# Patient Record
Sex: MALE | Race: WHITE | NOT HISPANIC OR LATINO | Employment: OTHER | URBAN - METROPOLITAN AREA
[De-identification: names, ages, dates, MRNs, and addresses within clinical notes are randomized per-mention and may not be internally consistent; named-entity substitution may affect disease eponyms.]

---

## 2019-01-25 ENCOUNTER — HOSPITAL ENCOUNTER (INPATIENT)
Facility: HOSPITAL | Age: 54
LOS: 2 days | Discharge: HOME | DRG: 287 | End: 2019-01-27
Attending: EMERGENCY MEDICINE | Admitting: HOSPITALIST
Payer: MEDICARE

## 2019-01-25 DIAGNOSIS — R07.9 CHEST PAIN: ICD-10-CM

## 2019-01-25 DIAGNOSIS — R00.8 TRIGEMINY: Primary | ICD-10-CM

## 2019-01-25 PROBLEM — G50.0 TRIGEMINAL NEURALGIA: Status: ACTIVE | Noted: 2019-01-25

## 2019-01-25 PROBLEM — J44.1 CHRONIC OBSTRUCTIVE PULMONARY DISEASE WITH ACUTE EXACERBATION: Status: ACTIVE | Noted: 2019-01-25

## 2019-01-25 PROBLEM — H54.8 LEGALLY BLIND: Status: ACTIVE | Noted: 2019-01-25

## 2019-01-25 PROBLEM — Z72.0 TOBACCO USE: Status: ACTIVE | Noted: 2019-01-25

## 2019-01-25 PROBLEM — F33.9 EPISODE OF RECURRENT MAJOR DEPRESSIVE DISORDER: Status: ACTIVE | Noted: 2019-01-25

## 2019-01-25 LAB
ALBUMIN SERPL BCP-MCNC: 3.6 G/DL
ALP SERPL-CCNC: 80 U/L
ALT SERPL W/O P-5'-P-CCNC: 15 U/L
ANION GAP SERPL CALC-SCNC: 10 MMOL/L
AST SERPL-CCNC: 12 U/L
BASOPHILS # BLD AUTO: 0.1 K/UL
BASOPHILS NFR BLD: 0.9 %
BILIRUB SERPL-MCNC: 0.3 MG/DL
BNP SERPL-MCNC: <10 PG/ML
BUN SERPL-MCNC: 14 MG/DL
CALCIUM SERPL-MCNC: 9.4 MG/DL
CHLORIDE SERPL-SCNC: 104 MMOL/L
CO2 SERPL-SCNC: 24 MMOL/L
CREAT SERPL-MCNC: 0.9 MG/DL
DIFFERENTIAL METHOD: ABNORMAL
EOSINOPHIL # BLD AUTO: 0.3 K/UL
EOSINOPHIL NFR BLD: 2.3 %
ERYTHROCYTE [DISTWIDTH] IN BLOOD BY AUTOMATED COUNT: 13.5 %
EST. GFR  (AFRICAN AMERICAN): >60 ML/MIN/1.73 M^2
EST. GFR  (NON AFRICAN AMERICAN): >60 ML/MIN/1.73 M^2
GLUCOSE SERPL-MCNC: 98 MG/DL
HCT VFR BLD AUTO: 45.3 %
HGB BLD-MCNC: 14.8 G/DL
LYMPHOCYTES # BLD AUTO: 4.3 K/UL
LYMPHOCYTES NFR BLD: 32.4 %
MCH RBC QN AUTO: 30.1 PG
MCHC RBC AUTO-ENTMCNC: 32.7 G/DL
MCV RBC AUTO: 92 FL
MONOCYTES # BLD AUTO: 0.6 K/UL
MONOCYTES NFR BLD: 4.7 %
NEUTROPHILS # BLD AUTO: 7.9 K/UL
NEUTROPHILS NFR BLD: 59.7 %
PLATELET # BLD AUTO: 327 K/UL
PMV BLD AUTO: 7.7 FL
POTASSIUM SERPL-SCNC: 4.3 MMOL/L
PROT SERPL-MCNC: 6.8 G/DL
RBC # BLD AUTO: 4.92 M/UL
SODIUM SERPL-SCNC: 138 MMOL/L
TROPONIN I SERPL DL<=0.01 NG/ML-MCNC: 0.01 NG/ML
TROPONIN I SERPL DL<=0.01 NG/ML-MCNC: 0.01 NG/ML
TROPONIN I SERPL DL<=0.01 NG/ML-MCNC: <0.006 NG/ML
WBC # BLD AUTO: 13.2 K/UL

## 2019-01-25 PROCEDURE — G0378 HOSPITAL OBSERVATION PER HR: HCPCS

## 2019-01-25 PROCEDURE — 25000003 PHARM REV CODE 250: Performed by: EMERGENCY MEDICINE

## 2019-01-25 PROCEDURE — 85025 COMPLETE CBC W/AUTO DIFF WBC: CPT

## 2019-01-25 PROCEDURE — 83880 ASSAY OF NATRIURETIC PEPTIDE: CPT

## 2019-01-25 PROCEDURE — 25000003 PHARM REV CODE 250: Performed by: HOSPITALIST

## 2019-01-25 PROCEDURE — 84484 ASSAY OF TROPONIN QUANT: CPT | Mod: 91

## 2019-01-25 PROCEDURE — 83036 HEMOGLOBIN GLYCOSYLATED A1C: CPT

## 2019-01-25 PROCEDURE — 63600175 PHARM REV CODE 636 W HCPCS: Performed by: HOSPITALIST

## 2019-01-25 PROCEDURE — 93005 ELECTROCARDIOGRAM TRACING: CPT

## 2019-01-25 PROCEDURE — 84484 ASSAY OF TROPONIN QUANT: CPT

## 2019-01-25 PROCEDURE — 36415 COLL VENOUS BLD VENIPUNCTURE: CPT

## 2019-01-25 PROCEDURE — 25000003 PHARM REV CODE 250: Performed by: NURSE PRACTITIONER

## 2019-01-25 PROCEDURE — 99285 EMERGENCY DEPT VISIT HI MDM: CPT | Mod: 25

## 2019-01-25 PROCEDURE — 80053 COMPREHEN METABOLIC PANEL: CPT

## 2019-01-25 PROCEDURE — 12000002 HC ACUTE/MED SURGE SEMI-PRIVATE ROOM

## 2019-01-25 RX ORDER — ONDANSETRON 2 MG/ML
4 INJECTION INTRAMUSCULAR; INTRAVENOUS EVERY 8 HOURS PRN
Status: DISCONTINUED | OUTPATIENT
Start: 2019-01-25 | End: 2019-01-27 | Stop reason: HOSPADM

## 2019-01-25 RX ORDER — GABAPENTIN 800 MG/1
800 TABLET ORAL 3 TIMES DAILY
COMMUNITY

## 2019-01-25 RX ORDER — LEVOTHYROXINE SODIUM 25 UG/1
25 TABLET ORAL DAILY
COMMUNITY

## 2019-01-25 RX ORDER — BENZONATATE 100 MG/1
200 CAPSULE ORAL 3 TIMES DAILY PRN
Status: DISCONTINUED | OUTPATIENT
Start: 2019-01-25 | End: 2019-01-27 | Stop reason: HOSPADM

## 2019-01-25 RX ORDER — IBUPROFEN 200 MG
24 TABLET ORAL
Status: DISCONTINUED | OUTPATIENT
Start: 2019-01-25 | End: 2019-01-27 | Stop reason: HOSPADM

## 2019-01-25 RX ORDER — FLUTICASONE PROPIONATE 50 MCG
1 SPRAY, SUSPENSION (ML) NASAL DAILY
COMMUNITY

## 2019-01-25 RX ORDER — SODIUM,POTASSIUM PHOSPHATES 280-250MG
2 POWDER IN PACKET (EA) ORAL
Status: DISCONTINUED | OUTPATIENT
Start: 2019-01-25 | End: 2019-01-27 | Stop reason: HOSPADM

## 2019-01-25 RX ORDER — CITALOPRAM 40 MG/1
40 TABLET, FILM COATED ORAL DAILY
COMMUNITY

## 2019-01-25 RX ORDER — LEVALBUTEROL 1.25 MG/.5ML
1.25 SOLUTION, CONCENTRATE RESPIRATORY (INHALATION) EVERY 8 HOURS
Status: DISCONTINUED | OUTPATIENT
Start: 2019-01-26 | End: 2019-01-27 | Stop reason: HOSPADM

## 2019-01-25 RX ORDER — LANOLIN ALCOHOL/MO/W.PET/CERES
800 CREAM (GRAM) TOPICAL
Status: DISCONTINUED | OUTPATIENT
Start: 2019-01-25 | End: 2019-01-27 | Stop reason: HOSPADM

## 2019-01-25 RX ORDER — ATORVASTATIN CALCIUM 40 MG/1
40 TABLET, FILM COATED ORAL DAILY
Status: ON HOLD | COMMUNITY
End: 2019-01-27 | Stop reason: HOSPADM

## 2019-01-25 RX ORDER — POTASSIUM CHLORIDE 20 MEQ/15ML
40 SOLUTION ORAL
Status: DISCONTINUED | OUTPATIENT
Start: 2019-01-25 | End: 2019-01-27 | Stop reason: HOSPADM

## 2019-01-25 RX ORDER — MORPHINE SULFATE 4 MG/ML
4 INJECTION, SOLUTION INTRAMUSCULAR; INTRAVENOUS EVERY 4 HOURS PRN
Status: DISCONTINUED | OUTPATIENT
Start: 2019-01-25 | End: 2019-01-27 | Stop reason: HOSPADM

## 2019-01-25 RX ORDER — ALBUTEROL SULFATE 90 UG/1
2 AEROSOL, METERED RESPIRATORY (INHALATION) EVERY 6 HOURS PRN
COMMUNITY

## 2019-01-25 RX ORDER — NITROGLYCERIN 0.4 MG/1
0.4 TABLET SUBLINGUAL EVERY 5 MIN PRN
Status: DISCONTINUED | OUTPATIENT
Start: 2019-01-25 | End: 2019-01-27 | Stop reason: HOSPADM

## 2019-01-25 RX ORDER — AZITHROMYCIN 250 MG/1
500 TABLET, FILM COATED ORAL ONCE
Status: COMPLETED | OUTPATIENT
Start: 2019-01-25 | End: 2019-01-25

## 2019-01-25 RX ORDER — ACETAMINOPHEN 325 MG/1
650 TABLET ORAL EVERY 6 HOURS PRN
Status: DISCONTINUED | OUTPATIENT
Start: 2019-01-25 | End: 2019-01-27 | Stop reason: HOSPADM

## 2019-01-25 RX ORDER — GABAPENTIN 400 MG/1
800 CAPSULE ORAL 3 TIMES DAILY
Status: DISCONTINUED | OUTPATIENT
Start: 2019-01-25 | End: 2019-01-27 | Stop reason: HOSPADM

## 2019-01-25 RX ORDER — GLUCAGON 1 MG
1 KIT INJECTION
Status: DISCONTINUED | OUTPATIENT
Start: 2019-01-25 | End: 2019-01-27 | Stop reason: HOSPADM

## 2019-01-25 RX ORDER — ASPIRIN 325 MG
325 TABLET ORAL
Status: COMPLETED | OUTPATIENT
Start: 2019-01-25 | End: 2019-01-25

## 2019-01-25 RX ORDER — IBUPROFEN 200 MG
16 TABLET ORAL
Status: DISCONTINUED | OUTPATIENT
Start: 2019-01-25 | End: 2019-01-27 | Stop reason: HOSPADM

## 2019-01-25 RX ORDER — CITALOPRAM 40 MG/1
40 TABLET, FILM COATED ORAL DAILY
Status: DISCONTINUED | OUTPATIENT
Start: 2019-01-26 | End: 2019-01-27 | Stop reason: HOSPADM

## 2019-01-25 RX ORDER — ATORVASTATIN CALCIUM 40 MG/1
40 TABLET, FILM COATED ORAL DAILY
Status: DISCONTINUED | OUTPATIENT
Start: 2019-01-26 | End: 2019-01-26

## 2019-01-25 RX ORDER — AZITHROMYCIN 250 MG/1
250 TABLET, FILM COATED ORAL DAILY
Status: DISCONTINUED | OUTPATIENT
Start: 2019-01-26 | End: 2019-01-27 | Stop reason: HOSPADM

## 2019-01-25 RX ORDER — METHYLPREDNISOLONE SOD SUCC 125 MG
80 VIAL (EA) INJECTION EVERY 8 HOURS
Status: DISCONTINUED | OUTPATIENT
Start: 2019-01-25 | End: 2019-01-26

## 2019-01-25 RX ORDER — SODIUM CHLORIDE 0.9 % (FLUSH) 0.9 %
5 SYRINGE (ML) INJECTION
Status: DISCONTINUED | OUTPATIENT
Start: 2019-01-25 | End: 2019-01-27 | Stop reason: HOSPADM

## 2019-01-25 RX ORDER — ACETAMINOPHEN 500 MG
500 TABLET ORAL EVERY 8 HOURS PRN
COMMUNITY

## 2019-01-25 RX ORDER — LEVOTHYROXINE SODIUM 25 UG/1
25 TABLET ORAL
Status: DISCONTINUED | OUTPATIENT
Start: 2019-01-26 | End: 2019-01-27 | Stop reason: HOSPADM

## 2019-01-25 RX ORDER — GUAIFENESIN 600 MG/1
600 TABLET, EXTENDED RELEASE ORAL 2 TIMES DAILY
Status: DISCONTINUED | OUTPATIENT
Start: 2019-01-25 | End: 2019-01-27 | Stop reason: HOSPADM

## 2019-01-25 RX ORDER — ALBUTEROL SULFATE 90 UG/1
2 AEROSOL, METERED RESPIRATORY (INHALATION) EVERY 6 HOURS PRN
Status: DISCONTINUED | OUTPATIENT
Start: 2019-01-25 | End: 2019-01-25

## 2019-01-25 RX ORDER — FAMOTIDINE 20 MG/1
20 TABLET, FILM COATED ORAL 2 TIMES DAILY
Status: DISCONTINUED | OUTPATIENT
Start: 2019-01-25 | End: 2019-01-27 | Stop reason: HOSPADM

## 2019-01-25 RX ORDER — METHOCARBAMOL 500 MG/1
500 TABLET, FILM COATED ORAL 4 TIMES DAILY PRN
Status: DISCONTINUED | OUTPATIENT
Start: 2019-01-25 | End: 2019-01-27 | Stop reason: HOSPADM

## 2019-01-25 RX ORDER — METHOCARBAMOL 500 MG/1
500 TABLET, FILM COATED ORAL 4 TIMES DAILY PRN
COMMUNITY

## 2019-01-25 RX ORDER — MORPHINE SULFATE 2 MG/ML
2 INJECTION, SOLUTION INTRAMUSCULAR; INTRAVENOUS EVERY 4 HOURS PRN
Status: DISCONTINUED | OUTPATIENT
Start: 2019-01-25 | End: 2019-01-27 | Stop reason: HOSPADM

## 2019-01-25 RX ORDER — ASPIRIN 325 MG
325 TABLET ORAL DAILY
Status: DISCONTINUED | OUTPATIENT
Start: 2019-01-26 | End: 2019-01-27 | Stop reason: HOSPADM

## 2019-01-25 RX ORDER — ACETAMINOPHEN 500 MG
500 TABLET ORAL EVERY 8 HOURS PRN
Status: DISCONTINUED | OUTPATIENT
Start: 2019-01-25 | End: 2019-01-25

## 2019-01-25 RX ADMIN — AZITHROMYCIN 500 MG: 250 TABLET, FILM COATED ORAL at 10:01

## 2019-01-25 RX ADMIN — GUAIFENESIN 600 MG: 600 TABLET, EXTENDED RELEASE ORAL at 08:01

## 2019-01-25 RX ADMIN — METHYLPREDNISOLONE SODIUM SUCCINATE 80 MG: 125 INJECTION, POWDER, FOR SOLUTION INTRAMUSCULAR; INTRAVENOUS at 08:01

## 2019-01-25 RX ADMIN — ASPIRIN 325 MG ORAL TABLET 325 MG: 325 PILL ORAL at 02:01

## 2019-01-25 RX ADMIN — FAMOTIDINE 20 MG: 20 TABLET, FILM COATED ORAL at 08:01

## 2019-01-25 RX ADMIN — BENZONATATE 200 MG: 100 CAPSULE ORAL at 10:01

## 2019-01-25 RX ADMIN — METHOCARBAMOL TABLETS 500 MG: 500 TABLET, COATED ORAL at 08:01

## 2019-01-25 RX ADMIN — GABAPENTIN 800 MG: 400 CAPSULE ORAL at 08:01

## 2019-01-25 NOTE — ED NOTES
Upon transfer to room 201, patient is AAOx4, no cardiac or respiratory complications. No needs or questions at this time.

## 2019-01-25 NOTE — UM SECONDARY REVIEW
Physician Advisor External    Level of Care Issue    Approved Inpatient for admit 1/25/19 per ehr md review..

## 2019-01-25 NOTE — ED NOTES
Patient has been updated on plan of care, results and admission. Dr. Kaiser is at the bedside of patient.

## 2019-01-25 NOTE — ED PROVIDER NOTES
Encounter Date: 2019    SCRIBE #1 NOTE: I, Sarah Hernandez, am scribing for, and in the presence of, Dr. Harshad Moran.       History     Chief Complaint   Patient presents with    Abnormal ECG     saw PCP for chest pain. EKG showed NSR with PVC's. sent for further evaluation.       Time seen by provider: 1:54 PM on 2019    Brandon Shahid Jr. is a 53 y.o. male with a PMHx that includes COPD, Trigeminal neuralgia, Hypercholesteremia, hypothyroid, high cholesterol, and blindness who presents to the ED with an onset of intermittent sharp left-sided chest pain that has been present for 8-9 months. Pt feels like something is twisting in his chest. Prior to arriving to the ED, he had an abnormal EKG at his PCP's office, Tonya Saldaña NP whom advised the pt to come to the ED for further evaluation. He has not seen anyone about his symptoms before because he refused to see someone in [place], which was his previous residence. Pt becomes short of breath while walking. He has at home breathing machine. He endorses feet swelling and fatigue. His wife notes that he often becomes nauseated and vomits after eating. His mother  at 44 years old due to a massive heart attack. The patient denies any other symptoms at this time. No pertinent PSHx noted. Pt is a smoker. No known drug allergies noted.       The history is provided by the patient and the spouse.     Review of patient's allergies indicates:  No Known Allergies  Past Medical History:   Diagnosis Date    Blind     Chronic back pain     COPD (chronic obstructive pulmonary disease)     Depression     Emphysema of lung     Hypercholesteremia     Hypothyroid     Trigeminal neuralgia      Past Surgical History:   Procedure Laterality Date    APPENDECTOMY      HEMORRHOID SURGERY       History reviewed. No pertinent family history.  Social History     Tobacco Use    Smoking status: Current Every Day Smoker     Packs/day: 1.50     Types: Cigarettes     Smokeless tobacco: Never Used   Substance Use Topics    Alcohol use: No     Frequency: Never    Drug use: No     Review of Systems   Constitutional: Positive for fatigue. Negative for fever.   HENT: Negative for sore throat.    Respiratory: Positive for shortness of breath.    Cardiovascular: Positive for chest pain.   Gastrointestinal: Positive for nausea and vomiting.   Genitourinary: Negative for dysuria.   Musculoskeletal: Positive for joint swelling. Negative for back pain.   Skin: Negative for rash.   Neurological: Negative for weakness.   Hematological: Does not bruise/bleed easily.       Physical Exam     Initial Vitals [01/25/19 1143]   BP Pulse Resp Temp SpO2   114/65 90 18 97.8 °F (36.6 °C) 98 %      MAP       --         Physical Exam    Nursing note and vitals reviewed.  Constitutional: He appears well-developed and well-nourished. No distress.   Blind. Smells of cigarettes.   HENT:   Head: Normocephalic and atraumatic.   Eyes: Conjunctivae and EOM are normal. Pupils are equal, round, and reactive to light.   Neck: Neck supple.   Cardiovascular: Normal rate, regular rhythm and normal heart sounds. Exam reveals no gallop and no friction rub.    No murmur heard.  Pulmonary/Chest: Effort normal. No respiratory distress. He has wheezes. He has no rhonchi. He has no rales.   Mild expiratory wheezing bilaterally.   Abdominal: Soft. Bowel sounds are normal. He exhibits no distension. There is no tenderness.   Musculoskeletal: Normal range of motion. He exhibits no edema or tenderness.        Right lower leg: He exhibits no edema.        Left lower leg: He exhibits no edema.   Neurological: He is alert and oriented to person, place, and time.   Skin: Skin is warm and dry.   Psychiatric: He has a normal mood and affect.         ED Course   Procedures  Labs Reviewed   CBC W/ AUTO DIFFERENTIAL - Abnormal; Notable for the following components:       Result Value    WBC 13.20 (*)     MPV 7.7 (*)     Gran #  (ANC) 7.9 (*)     All other components within normal limits   COMPREHENSIVE METABOLIC PANEL   TROPONIN I   B-TYPE NATRIURETIC PEPTIDE   TROPONIN I     EKG Readings: (Independently Interpreted)   Initial Reading: No STEMI.   EKG #1 from outpatient clinic: 80 bpm. Quadrigeminy. T wave inversion in aVL. No other signs of ST elevation, depressions, or abnormal intervals.   EKG #2 in ED:  83 bpm. Trigeminy. Physiological QRS shows no signs of acute ischemia. No ST elevation or depression. Normal axis. Normal intervals.       ECG Results          EKG 12-lead (Final result)  Result time 01/25/19 14:02:42    Final result by Interface, Lab In [facility] (01/25/19 14:02:42)                 Narrative:    Test Reason : R07.9,    Vent. Rate : 083 BPM     Atrial Rate : 083 BPM     P-R Int : 126 ms          QRS Dur : 084 ms      QT Int : 366 ms       P-R-T Axes : 041 040 044 degrees     QTc Int : 430 ms    Sinus rhythm with frequent Premature ventricular complexes  Otherwise normal ECG  No previous ECGs available  Confirmed by BROOKLYN SMART MD ([id number]) on 1/25/2019 2:02:32 PM    Referred By: [email or web address]   SELF           Confirmed By:BROOKLYN SMART MD                            Imaging Results          X-Ray Chest AP Portable (Final result)  Result time 01/25/19 14:46:40    Final result by Jean Marie Baker Jr., MD (01/25/19 14:46:40)                 Impression:      No acute abnormality.      Electronically signed by: Jean Marie Baker MD  Date:    01/25/2019  Time:    14:46             Narrative:    EXAMINATION:  XR CHEST AP PORTABLE    CLINICAL HISTORY:  Chest Pain;    TECHNIQUE:  Single frontal view of the chest was performed.    COMPARISON:  None    FINDINGS:  The lungs are clear, with normal appearance of pulmonary vasculature and no pleural effusion or pneumothorax.    The cardiac silhouette is normal in size. The hilar and mediastinal contours are unremarkable.    Bones are intact.                                 Medical  Decision Making:   History:   Old Medical Records: I decided to obtain old medical records.  Clinical Tests:   Lab Tests: Ordered and Reviewed  Radiological Study: Ordered and Reviewed  Medical Tests: Ordered and Reviewed  Patient will be admitted for chest pain given risk factors and a heart score 4 with trigeminy on EKG.  I spoke with hospitalist agrees with plan.  Troponin is negative I do not think he needs emergent angiogram at this time.  We will admit here for further evaluation / stratification of his chest pain.            Scribe Attestation:   Scribe #1: I performed the above scribed service and the documentation accurately describes the services I performed. I attest to the accuracy of the note.    I, Dr. Harshad Moran personally performed the services described in this documentation. All medical record entries made by the scribe were at my direction and in my presence.  I have reviewed the chart and agree that the record reflects my personal performance and is accurate and complete. Harshad Moran MD.  4:36 PM 01/25/2019    DISCLAIMER: This note was prepared with Dragon NaturallySpeaking voice recognition transcription software. Garbled syntax, mangled pronouns, and other bizarre constructions may be attributed to that software system            Clinical Impression:   The primary encounter diagnosis was Trigeminy. A diagnosis of Chest pain was also pertinent to this visit.      Disposition:   Disposition: Placed in Observation                        Harshad Moran MD  01/25/19 2146

## 2019-01-25 NOTE — ED NOTES
"Presents to the ER from his PCP office for evaluation of NSR with PVCs. Associated complaints are nausea, emesis with the last episode being 2 days ago, productive cough, and body aches. Patient reports left sided chest pain that "Feels like pain and is empty." Denies SOB, diaphoresis, nausea or vomiting with chest pain. Mucous membranes are pink and moist. Skin is warm, dry and intact. Lungs are clear bilaterally, respirations are regular and unlabored. Denies congestion, rhinorrhea or SOB. BS active x4, no tenderness with palpation, abd is soft and not distended. Denies any appetite or activity change. S1S2, capillary refill is < 2 seconds. Denies dysuria, difficulty urinating, frequency, numbness, tingling or weakness. CHHAYA DE LA O    "

## 2019-01-26 PROBLEM — F17.200 TOBACCO DEPENDENCY: Status: ACTIVE | Noted: 2019-01-25

## 2019-01-26 PROBLEM — E03.9 ACQUIRED HYPOTHYROIDISM: Status: ACTIVE | Noted: 2019-01-26

## 2019-01-26 LAB
ALBUMIN SERPL BCP-MCNC: 3.6 G/DL
ALP SERPL-CCNC: 85 U/L
ALT SERPL W/O P-5'-P-CCNC: 17 U/L
ANION GAP SERPL CALC-SCNC: 10 MMOL/L
AST SERPL-CCNC: 13 U/L
BASOPHILS # BLD AUTO: 0 K/UL
BASOPHILS NFR BLD: 0.4 %
BILIRUB SERPL-MCNC: 0.2 MG/DL
BUN SERPL-MCNC: 14 MG/DL
CALCIUM SERPL-MCNC: 9.9 MG/DL
CHLORIDE SERPL-SCNC: 105 MMOL/L
CHOLEST SERPL-MCNC: 247 MG/DL
CHOLEST SERPL-MCNC: 247 MG/DL
CHOLEST/HDLC SERPL: 6.5 {RATIO}
CHOLEST/HDLC SERPL: 6.5 {RATIO}
CO2 SERPL-SCNC: 22 MMOL/L
CREAT SERPL-MCNC: 1 MG/DL
DIFFERENTIAL METHOD: ABNORMAL
EOSINOPHIL # BLD AUTO: 0 K/UL
EOSINOPHIL NFR BLD: 0 %
ERYTHROCYTE [DISTWIDTH] IN BLOOD BY AUTOMATED COUNT: 13.7 %
EST. GFR  (AFRICAN AMERICAN): >60 ML/MIN/1.73 M^2
EST. GFR  (NON AFRICAN AMERICAN): >60 ML/MIN/1.73 M^2
ESTIMATED AVG GLUCOSE: 111 MG/DL
GLUCOSE SERPL-MCNC: 165 MG/DL
HBA1C MFR BLD HPLC: 5.5 %
HCT VFR BLD AUTO: 46.8 %
HDLC SERPL-MCNC: 38 MG/DL
HDLC SERPL-MCNC: 38 MG/DL
HDLC SERPL: 15.4 %
HDLC SERPL: 15.4 %
HGB BLD-MCNC: 15.3 G/DL
LDLC SERPL CALC-MCNC: 181.6 MG/DL
LDLC SERPL CALC-MCNC: 181.6 MG/DL
LYMPHOCYTES # BLD AUTO: 1.3 K/UL
LYMPHOCYTES NFR BLD: 14 %
MAGNESIUM SERPL-MCNC: 2.3 MG/DL
MCH RBC QN AUTO: 30.2 PG
MCHC RBC AUTO-ENTMCNC: 32.8 G/DL
MCV RBC AUTO: 92 FL
MONOCYTES # BLD AUTO: 0.1 K/UL
MONOCYTES NFR BLD: 0.6 %
NEUTROPHILS # BLD AUTO: 7.8 K/UL
NEUTROPHILS NFR BLD: 85 %
NONHDLC SERPL-MCNC: 209 MG/DL
NONHDLC SERPL-MCNC: 209 MG/DL
PLATELET # BLD AUTO: 335 K/UL
PMV BLD AUTO: 7.7 FL
POTASSIUM SERPL-SCNC: 4.7 MMOL/L
PROT SERPL-MCNC: 7.1 G/DL
RBC # BLD AUTO: 5.07 M/UL
SODIUM SERPL-SCNC: 137 MMOL/L
TRIGL SERPL-MCNC: 137 MG/DL
TRIGL SERPL-MCNC: 137 MG/DL
TROPONIN I SERPL DL<=0.01 NG/ML-MCNC: <0.006 NG/ML
TSH SERPL DL<=0.005 MIU/L-ACNC: 1.46 UIU/ML
WBC # BLD AUTO: 9.2 K/UL

## 2019-01-26 PROCEDURE — 94761 N-INVAS EAR/PLS OXIMETRY MLT: CPT

## 2019-01-26 PROCEDURE — 36415 COLL VENOUS BLD VENIPUNCTURE: CPT

## 2019-01-26 PROCEDURE — 12000002 HC ACUTE/MED SURGE SEMI-PRIVATE ROOM

## 2019-01-26 PROCEDURE — 80053 COMPREHEN METABOLIC PANEL: CPT

## 2019-01-26 PROCEDURE — 25000003 PHARM REV CODE 250: Performed by: NURSE PRACTITIONER

## 2019-01-26 PROCEDURE — 25000003 PHARM REV CODE 250: Performed by: HOSPITALIST

## 2019-01-26 PROCEDURE — 25000003 PHARM REV CODE 250: Performed by: EMERGENCY MEDICINE

## 2019-01-26 PROCEDURE — 63600175 PHARM REV CODE 636 W HCPCS: Performed by: HOSPITALIST

## 2019-01-26 PROCEDURE — 83735 ASSAY OF MAGNESIUM: CPT

## 2019-01-26 PROCEDURE — 63600175 PHARM REV CODE 636 W HCPCS: Performed by: NURSE PRACTITIONER

## 2019-01-26 PROCEDURE — 80061 LIPID PANEL: CPT

## 2019-01-26 PROCEDURE — 84484 ASSAY OF TROPONIN QUANT: CPT

## 2019-01-26 PROCEDURE — 84443 ASSAY THYROID STIM HORMONE: CPT

## 2019-01-26 PROCEDURE — 94640 AIRWAY INHALATION TREATMENT: CPT

## 2019-01-26 PROCEDURE — 25000242 PHARM REV CODE 250 ALT 637 W/ HCPCS: Performed by: HOSPITALIST

## 2019-01-26 PROCEDURE — 85025 COMPLETE CBC W/AUTO DIFF WBC: CPT

## 2019-01-26 RX ORDER — PREDNISONE 20 MG/1
40 TABLET ORAL DAILY
Status: DISCONTINUED | OUTPATIENT
Start: 2019-01-26 | End: 2019-01-27 | Stop reason: HOSPADM

## 2019-01-26 RX ORDER — ATORVASTATIN CALCIUM 40 MG/1
80 TABLET, FILM COATED ORAL DAILY
Status: DISCONTINUED | OUTPATIENT
Start: 2019-01-26 | End: 2019-01-27 | Stop reason: HOSPADM

## 2019-01-26 RX ADMIN — LEVALBUTEROL 1.25 MG: 1.25 SOLUTION, CONCENTRATE RESPIRATORY (INHALATION) at 12:01

## 2019-01-26 RX ADMIN — FAMOTIDINE 20 MG: 20 TABLET, FILM COATED ORAL at 01:01

## 2019-01-26 RX ADMIN — ASPIRIN 325 MG ORAL TABLET 325 MG: 325 PILL ORAL at 01:01

## 2019-01-26 RX ADMIN — GABAPENTIN 800 MG: 400 CAPSULE ORAL at 08:01

## 2019-01-26 RX ADMIN — CITALOPRAM HYDROBROMIDE 40 MG: 40 TABLET ORAL at 01:01

## 2019-01-26 RX ADMIN — GUAIFENESIN 600 MG: 600 TABLET, EXTENDED RELEASE ORAL at 01:01

## 2019-01-26 RX ADMIN — METHYLPREDNISOLONE SODIUM SUCCINATE 80 MG: 125 INJECTION, POWDER, FOR SOLUTION INTRAMUSCULAR; INTRAVENOUS at 05:01

## 2019-01-26 RX ADMIN — FAMOTIDINE 20 MG: 20 TABLET, FILM COATED ORAL at 08:01

## 2019-01-26 RX ADMIN — ACETAMINOPHEN 650 MG: 325 TABLET, FILM COATED ORAL at 08:01

## 2019-01-26 RX ADMIN — ATORVASTATIN CALCIUM 80 MG: 40 TABLET, FILM COATED ORAL at 01:01

## 2019-01-26 RX ADMIN — PREDNISONE 40 MG: 20 TABLET ORAL at 01:01

## 2019-01-26 RX ADMIN — LEVALBUTEROL 1.25 MG: 1.25 SOLUTION, CONCENTRATE RESPIRATORY (INHALATION) at 07:01

## 2019-01-26 RX ADMIN — AZITHROMYCIN 250 MG: 250 TABLET, FILM COATED ORAL at 08:01

## 2019-01-26 RX ADMIN — GUAIFENESIN 600 MG: 600 TABLET, EXTENDED RELEASE ORAL at 08:01

## 2019-01-26 RX ADMIN — GABAPENTIN 800 MG: 400 CAPSULE ORAL at 01:01

## 2019-01-26 RX ADMIN — LEVALBUTEROL 1.25 MG: 1.25 SOLUTION, CONCENTRATE RESPIRATORY (INHALATION) at 11:01

## 2019-01-26 RX ADMIN — ACETAMINOPHEN 650 MG: 325 TABLET, FILM COATED ORAL at 01:01

## 2019-01-26 RX ADMIN — LEVALBUTEROL 1.25 MG: 1.25 SOLUTION, CONCENTRATE RESPIRATORY (INHALATION) at 03:01

## 2019-01-26 NOTE — ASSESSMENT & PLAN NOTE
Health hazards associated with cigarette smoking were reviewed with patient and cessation was encouraged. Nicotine replacement and counseling options were discussed.  Spent 3 minutes counseling on cessation, patient knows he needs to quit.  He refuses nicotine patch.

## 2019-01-26 NOTE — PLAN OF CARE
Problem: Adult Inpatient Plan of Care  Goal: Plan of Care Review  Patient alert and oriented.  Oriented patient to belongings at bedside, due to blindness.  Bigeminy rhythm with PVC's on cardiac monitor.  Assisted to bathroom X 2.  NPO, patient verbal understanding.  Cardiology consult this AM.  Full code.  Call light in reach.  Bed in low/locked position.  Bed alarm set for patient's safety.

## 2019-01-26 NOTE — SUBJECTIVE & OBJECTIVE
Past Medical History:   Diagnosis Date    Blind     Chronic back pain     COPD (chronic obstructive pulmonary disease)     Depression     Emphysema of lung     Hypercholesteremia     Hypothyroid     Trigeminal neuralgia        Past Surgical History:   Procedure Laterality Date    APPENDECTOMY      HEMORRHOID SURGERY         Review of patient's allergies indicates:  No Known Allergies    No current facility-administered medications on file prior to encounter.      Current Outpatient Medications on File Prior to Encounter   Medication Sig    acetaminophen (TYLENOL EXTRA STRENGTH) 500 MG tablet Take 500 mg by mouth every 8 (eight) hours as needed (pain).     albuterol (PROVENTIL HFA) 90 mcg/actuation inhaler Inhale 2 puffs into the lungs every 6 (six) hours as needed for Wheezing. Rescue    atorvastatin (LIPITOR) 40 MG tablet Take 40 mg by mouth once daily.    citalopram (CELEXA) 40 MG tablet Take 40 mg by mouth once daily.    fluticasone (FLONASE) 50 mcg/actuation nasal spray 1 spray by Each Nare route once daily.    gabapentin (NEURONTIN) 800 MG tablet Take 800 mg by mouth 3 (three) times daily.    levothyroxine (SYNTHROID) 25 MCG tablet Take 25 mcg by mouth once daily.    methocarbamol (ROBAXIN) 500 MG Tab Take 500 mg by mouth 4 (four) times daily as needed (muscle spasm).     ranitidine (ACID REDUCER, RANITIDINE,) 150 MG tablet Take 150 mg by mouth once daily.      Family History     Problem Relation (Age of Onset)    Heart disease Mother    No Known Problems Father        Tobacco Use    Smoking status: Current Every Day Smoker     Packs/day: 1.50     Types: Cigarettes    Smokeless tobacco: Never Used   Substance and Sexual Activity    Alcohol use: No     Frequency: Never    Drug use: No    Sexual activity: Not on file     Review of Systems   Constitutional: Positive for chills. Negative for activity change, appetite change and fatigue.   HENT: Negative for congestion, postnasal drip, sore  throat and trouble swallowing.    Eyes: Positive for visual disturbance (blind).   Respiratory: Positive for cough, shortness of breath and wheezing. Negative for chest tightness.    Cardiovascular: Positive for chest pain. Negative for palpitations and leg swelling.   Gastrointestinal: Positive for constipation, diarrhea, nausea and vomiting. Negative for abdominal pain.   Genitourinary: Negative for difficulty urinating, dysuria, flank pain and hematuria.   Musculoskeletal: Negative for arthralgias and myalgias.   Skin: Negative for color change.   Neurological: Positive for headaches (chronic HA issues). Negative for dizziness, syncope, weakness and light-headedness.   Psychiatric/Behavioral: Negative for confusion. The patient is not nervous/anxious.      Objective:     Vital Signs (Most Recent):  Temp: 96.4 °F (35.8 °C) (01/25/19 1955)  Pulse: 81 (01/25/19 1955)  Resp: 19 (01/25/19 1955)  BP: 113/72 (01/25/19 1955)  SpO2: 95 % (01/25/19 1955) Vital Signs (24h Range):  Temp:  [96.4 °F (35.8 °C)-97.8 °F (36.6 °C)] 96.4 °F (35.8 °C)  Pulse:  [63-92] 81  Resp:  [18-19] 19  SpO2:  [95 %-99 %] 95 %  BP: (113-137)/(65-88) 113/72     Weight: 95.1 kg (209 lb 10.5 oz)  Body mass index is 28.43 kg/m².    Physical Exam   Constitutional: He is oriented to person, place, and time. He appears well-developed and well-nourished. No distress.   HENT:   Head: Normocephalic and atraumatic.   Eyes: Conjunctivae are normal.   Blind     Neck: Normal range of motion. Neck supple.   Cardiovascular: Normal rate, regular rhythm, normal heart sounds and intact distal pulses.   No murmur heard.  Pulmonary/Chest: Effort normal. No respiratory distress.   Frequent coughing; diminished breath sounds   Abdominal: Soft. Bowel sounds are normal. He exhibits no distension.   Musculoskeletal: Normal range of motion. He exhibits no edema or tenderness.   Neurological: He is alert and oriented to person, place, and time. No cranial nerve deficit.    Skin: Skin is warm and dry. Capillary refill takes less than 2 seconds. No erythema.   Psychiatric: He has a normal mood and affect. His behavior is normal. Judgment and thought content normal.           Significant Labs:   CBC:   Recent Labs   Lab 01/25/19  1452   WBC 13.20*   HGB 14.8   HCT 45.3        CMP:   Recent Labs   Lab 01/25/19  1452      K 4.3      CO2 24   GLU 98   BUN 14   CREATININE 0.9   CALCIUM 9.4   PROT 6.8   ALBUMIN 3.6   BILITOT 0.3   ALKPHOS 80   AST 12   ALT 15   ANIONGAP 10   EGFRNONAA >60     Troponin:   Recent Labs   Lab 01/25/19  1452 01/25/19  1703   TROPONINI 0.009 0.007       Significant Imaging:   EKG: I reviewed. NSR 83 with frequent PVCs, no ischemic changes noted.     CXR: No acute abnormality.

## 2019-01-26 NOTE — PROGRESS NOTES
[facility] Medicine  Progress Note    Patient Name: Brandon Shahid Jr.  MRN: [id number]  Patient Class: IP- Inpatient   Admission Date: 1/25/2019  Length of Stay: 1 days  Attending Physician: Soha Ballard MD  Primary Care Provider: Tonya Saldaña NP        Subjective:     Principal Problem:Trigeminy    HPI:  Brandon Shahid is a 52 yo male with PMHx significant for COPD, blindness, thyroid disease, trigeminal neuralgia, HLD, and depression. He presented to the ED from the urgent care for further evaluation of chest pain.  He presented to his PCP with c/o chest pain that per patient began months ago, but has worsened.  Pain is mid-sternal and waxes and wanes in severity.  Pain sometimes seems related to cough.  He states he has been suffering from increased SOB, cough with mucous production, and wheezing for several weeks.  He reports minimal improvement with home inhaler.  He had an abnormal EKG with trigeminy at PCP and was referred to ED.  Patient  Reports SOB is worse with exertion but that CP has been coming and going intermittently for months with no exacerbating or alleviating factors and no radiation.  He continues to smoke 1.5 ppd.  He endorses some nausea and occasional emesis after eating.  He denies any abdominal pain, urinary complaints, or leg swelling.  He states his mother had a massive MI in her 40s.  He denies any historical cardiac work up.  Other pertinent medical history as below:    Hospital Course:  No notes on file    Interval History: no new issues overnight. Still with intermittent chest pain and palpations. Denies shortness of breath. Family at bedside. Awaiting cardiology consult    Review of Systems   Constitutional: Positive for activity change. Negative for appetite change and diaphoresis.   HENT: Negative for sore throat.    Eyes: Positive for visual disturbance (legally blind).   Respiratory: Negative for cough, shortness of breath and wheezing.     Cardiovascular: Positive for chest pain and palpitations. Negative for leg swelling.   Gastrointestinal: Negative for abdominal pain and nausea.   Musculoskeletal: Negative for arthralgias.   Neurological: Negative for dizziness, syncope and light-headedness.     Objective:     Vital Signs (Most Recent):  Temp: 97 °F (36.1 °C) (01/26/19 1123)  Pulse: 77 (01/26/19 1123)  Resp: 20 (01/26/19 1123)  BP: 112/69 (01/26/19 1123)  SpO2: (!) 93 % (01/26/19 1123) Vital Signs (24h Range):  Temp:  [96.4 °F (35.8 °C)-97.6 °F (36.4 °C)] 97 °F (36.1 °C)  Pulse:  [63-95] 77  Resp:  [15-20] 20  SpO2:  [93 %-99 %] 93 %  BP: (112-137)/(68-88) 112/69     Weight: 95.1 kg (209 lb 10.5 oz)  Body mass index is 28.43 kg/m².    Intake/Output Summary (Last 24 hours) at 1/26/2019 1243  Last data filed at 1/26/2019 0500  Gross per 24 hour   Intake 260 ml   Output --   Net 260 ml      Physical Exam   Constitutional: He is oriented to person, place, and time. He appears well-developed and well-nourished.   HENT:   Head: Normocephalic and atraumatic.   Nose: Nose normal.   Mouth/Throat: Oropharynx is clear and moist.   Eyes:   Eyes closed. Legally blind bilaterally    Neck: Normal range of motion. Neck supple.   Cardiovascular: Normal rate, regular rhythm and intact distal pulses.   Murmur (ectopy) heard.  Pulmonary/Chest: Effort normal and breath sounds normal. He has no wheezes.   Abdominal: Soft. Bowel sounds are normal. There is no tenderness.   Musculoskeletal: Normal range of motion.   Neurological: He is alert and oriented to person, place, and time.   Skin: Skin is warm and dry.   Psychiatric: He has a normal mood and affect. His behavior is normal.   Nursing note and vitals reviewed.      Significant Labs:   BMP:   Recent Labs   Lab 01/26/19  0309   *      K 4.7      CO2 22*   BUN 14   CREATININE 1.0   CALCIUM 9.9   MG 2.3     CBC:   Recent Labs   Lab 01/25/19  1452 01/26/19  0309   WBC 13.20* 9.20   HGB 14.8 15.3    HCT 45.3 46.8    335       Significant Imaging: I have reviewed and interpreted all pertinent imaging results/findings within the past 24 hours.    Assessment/Plan:      * Trigeminy    Present prior to and at time of arrival- resolved.  No acute electrolyte abnormalities.  Monitor on telemetry.  Cardiology consulted        Acquired hypothyroidism    Check TSH.  Continue levothyroxine and adjust as clinically necessary.       Trigeminal neuralgia    Chronic issue, continue gabapentin.       Episode of recurrent major depressive disorder    Chronic issue- well-controlled on current medication.  Continue citalopram.       Chronic obstructive pulmonary disease with acute exacerbation    Continue IV steroids, bronchodilators, and O2 as required to maintain sats > 92%.  Add azithromycin.  Discussed need to stop smoking.       Legally blind    Maintain fall precautions and assist patient as required in ADLs.       Tobacco dependency    Health hazards associated with cigarette smoking were reviewed with patient and cessation was encouraged. Nicotine replacement and counseling options were discussed.  Spent 3 minutes counseling on cessation, patient knows he needs to quit.  He refuses nicotine patch.         Chest pain    Atypical.  Reproducible on exam and possibly pleuritic 2/2 COPD exacerbation.  Patient has several risk factors.  Will monitor on telemetry, trend troponin, keep NPO for now, and consult with cardiology in AM.          VTE Risk Mitigation (From admission, onward)        Ordered     Place RAFA hose  Until discontinued      01/26/19 0053     Place sequential compression device  Until discontinued      01/26/19 0053     IP VTE LOW RISK PATIENT  Once      01/25/19 1811        Addendum: discussed with Dr. Bee. Recommends coronary angiogram. Plan transfer to [facility] in am. Spoke with house supervisor       Pauline Leon NP  Department of [facility]   [facility]

## 2019-01-26 NOTE — CONSULTS
[facility]  Cardiology  Consult Note    Patient Name: Brandon Shahid Jr.  MRN: [id number]  Admission Date: 1/25/2019  Hospital Length of Stay: 1 days  Code Status: Full Code   Attending Provider: Jeremy Bee MD  Consulting Provider: Jeremy Bee MD  Primary Care Physician: Tonya Saldaña NP  Principal Problem:Chest pain    Patient information was obtained from patient and ER records.     Consults  Subjective:     Chief Complaint:  cp    Jas Shahid is a 52 yo male with PMHx significant for COPD, blindness, thyroid disease, trigeminal neuralgia, HLD, and depression. He presented to the ED from the urgent care for further evaluation of chest pain.  He presented to his PCP with c/o chest pain that per patient began months ago, but has worsened.  Pain is mid-sternal and waxes and wanes in severity.  Pain sometimes seems related to cough.  He states he has been suffering from increased SOB, cough with mucous production, and wheezing for several weeks.  He reports minimal improvement with home inhaler.  He had an abnormal EKG with trigeminy at PCP and was referred to ED.  Patient  Reports SOB is worse with exertion but that CP has been coming and going intermittently for months with no exacerbating or alleviating factors and no radiation.  He continues to smoke 1.5 ppd.  He endorses some nausea and occasional emesis after eating.  He denies any abdominal pain, urinary complaints, or leg swelling.  He states his mother had a massive MI in her 40s.  He denies any historical cardiac work up.  Other pertinent medical history as below:has multiple co morbilities , smoker diabetic,family history of mi and hyperlipidemia, sierra proceed to do angiogram and family and pte agree.        Past Medical History:   Diagnosis Date    Blind     Chronic back pain     COPD (chronic obstructive pulmonary disease)     Depression     Emphysema of lung     Hypercholesteremia     Hypothyroid     Trigeminal  neuralgia        Past Surgical History:   Procedure Laterality Date    APPENDECTOMY      HEMORRHOID SURGERY         Review of patient's allergies indicates:  No Known Allergies    No current facility-administered medications on file prior to encounter.      Current Outpatient Medications on File Prior to Encounter   Medication Sig    acetaminophen (TYLENOL EXTRA STRENGTH) 500 MG tablet Take 500 mg by mouth every 8 (eight) hours as needed (pain).     albuterol (PROVENTIL HFA) 90 mcg/actuation inhaler Inhale 2 puffs into the lungs every 6 (six) hours as needed for Wheezing. Rescue    atorvastatin (LIPITOR) 40 MG tablet Take 40 mg by mouth once daily.    citalopram (CELEXA) 40 MG tablet Take 40 mg by mouth once daily.    fluticasone (FLONASE) 50 mcg/actuation nasal spray 1 spray by Each Nare route once daily.    gabapentin (NEURONTIN) 800 MG tablet Take 800 mg by mouth 3 (three) times daily.    levothyroxine (SYNTHROID) 25 MCG tablet Take 25 mcg by mouth once daily.    methocarbamol (ROBAXIN) 500 MG Tab Take 500 mg by mouth 4 (four) times daily as needed (muscle spasm).     ranitidine (ACID REDUCER, RANITIDINE,) 150 MG tablet Take 150 mg by mouth once daily.      Family History     Problem Relation (Age of Onset)    Heart disease Mother    No Known Problems Father        Tobacco Use    Smoking status: Current Every Day Smoker     Packs/day: 1.50     Types: Cigarettes    Smokeless tobacco: Never Used   Substance and Sexual Activity    Alcohol use: No     Frequency: Never    Drug use: No    Sexual activity: Not on file     Review of Systems   HENT: Negative.    Cardiovascular: Positive for chest pain.   Respiratory: Negative.    Endocrine: Negative.      Objective:     Vital Signs (Most Recent):  Temp: 97 °F (36.1 °C) (01/26/19 1123)  Pulse: 77 (01/26/19 1123)  Resp: 20 (01/26/19 1123)  BP: 112/69 (01/26/19 1123)  SpO2: (!) 93 % (01/26/19 1123) Vital Signs (24h Range):  Temp:  [96.4 °F (35.8 °C)-97.6 °F  (36.4 °C)] 97 °F (36.1 °C)  Pulse:  [63-95] 77  Resp:  [15-20] 20  SpO2:  [93 %-99 %] 93 %  BP: (112-137)/(68-88) 112/69     Weight: 95.1 kg (209 lb 10.5 oz)  Body mass index is 28.43 kg/m².    SpO2: (!) 93 %  O2 Device (Oxygen Therapy): room air      Intake/Output Summary (Last 24 hours) at 1/26/2019 1240  Last data filed at 1/26/2019 0500  Gross per 24 hour   Intake 260 ml   Output --   Net 260 ml       Lines/Drains/Airways     Peripheral Intravenous Line                 Peripheral IV - Single Lumen 01/25/19 1706 Left Hand less than 1 day                Physical Exam   Constitutional: He appears well-developed and well-nourished.   Neck: Normal range of motion.   Cardiovascular: Normal rate and regular rhythm.   Pulmonary/Chest: Effort normal and breath sounds normal.   Abdominal: Soft.   Musculoskeletal: Normal range of motion.   Neurological: He is alert.       Significant Labs:   BMP:   Recent Labs   Lab 01/25/19  1452 01/26/19  0309   GLU 98 165*    137   K 4.3 4.7    105   CO2 24 22*   BUN 14 14   CREATININE 0.9 1.0   CALCIUM 9.4 9.9   MG  --  2.3   , CMP   Recent Labs   Lab 01/25/19  1452 01/26/19  0309    137   K 4.3 4.7    105   CO2 24 22*   GLU 98 165*   BUN 14 14   CREATININE 0.9 1.0   CALCIUM 9.4 9.9   PROT 6.8 7.1   ALBUMIN 3.6 3.6   BILITOT 0.3 0.2   ALKPHOS 80 85   AST 12 13   ALT 15 17   ANIONGAP 10 10   ESTGFRAFRICA >60 >60   EGFRNONAA >60 >60   , CBC   Recent Labs   Lab 01/25/19  1452 01/26/19  0309   WBC 13.20* 9.20   HGB 14.8 15.3   HCT 45.3 46.8    335   , INR No results for input(s): INR, PROTIME in the last 48 hours., Lipid Panel   Recent Labs   Lab 01/26/19  0309   CHOL 247*  247*   HDL 38*  38*   LDLCALC 181.6*  181.6*   TRIG 137  137   CHOLHDL 15.4*  15.4*   ,   Pathology Results  (Last 10 years)    None      , All pertinent lab results from the last 24 hours have been reviewed. and None    Significant Imaging:   Assessment and Plan:     Active  Diagnoses:    Diagnosis Date Noted POA    PRINCIPAL PROBLEM:  Chest pain [R07.9] 01/25/2019 Yes    Acquired hypothyroidism [E03.9] 01/26/2019 Yes    Trigeminy [R00.8] 01/25/2019 Yes    Tobacco use [Z72.0] 01/25/2019 Yes    Legally blind [H54.8] 01/25/2019 Yes    Chronic obstructive pulmonary disease with acute exacerbation [J44.1] 01/25/2019 Yes    Episode of recurrent major depressive disorder [F33.9] 01/25/2019 Yes    Trigeminal neuralgia [G50.0] 01/25/2019 Yes      Problems Resolved During this Admission:       VTE Risk Mitigation (From admission, onward)        Ordered     Place RAFA hose  Until discontinued      01/26/19 0053     Place sequential compression device  Until discontinued      01/26/19 0053     IP VTE LOW RISK PATIENT  Once      01/25/19 1811          Thank you for your consult. I will follow-up with patient. Please contact us if you have any additional questions.   Will like to transfer to [facility] for angiogram in am    Jeremy Bee MD  Cardiology   [facility]

## 2019-01-26 NOTE — ASSESSMENT & PLAN NOTE
Present prior to and at time of arrival- resolved.  No acute electrolyte abnormalities.  Monitor on telemetry.  Cardiology consulted

## 2019-01-26 NOTE — PLAN OF CARE
SW met w/ pt for assessment.  Pt lives w/ spouse and daughter--address is his daughter's home.  Pt and spouse recently moved here from [place].  Pt denies current HH .       01/26/19 1239   Discharge Assessment   Assessment Type Discharge Planning Assessment   Confirmed/corrected address and phone number on facesheet? Yes   Assessment information obtained from? Patient   Prior to hospitilization cognitive status: Alert/Oriented   Prior to hospitalization functional status: Independent   Current cognitive status: Alert/Oriented   Current Functional Status: Independent   Facility Arrived From: home   Lives With spouse;child(emilio), adult   Able to Return to Prior Arrangements yes   Is patient able to care for self after discharge? Yes   Who are your caregiver(s) and their phone number(s)? Daughter: Soniya Haynes  [phone]   Patient's perception of discharge disposition home or selfcare   Readmission Within the Last 30 Days no previous admission in last 30 days   Patient currently being followed by outpatient case management? No   Patient currently receives any other outside agency services? No   Equipment Currently Used at Home cane, straight   Do you have any problems affording any of your prescribed medications? No   Is the patient taking medications as prescribed? yes   Does the patient have transportation home? Yes   Transportation Anticipated family or friend will provide   Does the patient receive services at the [facility]? No   Discharge Plan A Home with family   Discharge Plan B Home with family   DME Needed Upon Discharge  none   Patient/Family in Agreement with Plan yes

## 2019-01-26 NOTE — PLAN OF CARE
01/26/19 0723   Patient Assessment/Suction   Level of Consciousness (AVPU) alert   All Lung Fields Breath Sounds diminished   PRE-TX-O2   O2 Device (Oxygen Therapy) room air   SpO2 95 %   Pulse Oximetry Type Intermittent   $ Pulse Oximetry - Multiple Charge Pulse Oximetry - Multiple   Pulse 95   Resp 16   Aerosol Therapy   $ Aerosol Therapy Charges Aerosol Treatment   Respiratory Treatment Status (SVN) given   Treatment Route (SVN) mask   Patient Position (SVN) HOB elevated   Post Treatment Assessment (SVN) breath sounds unchanged   Signs of Intolerance (SVN) none   Breath Sounds Post-Respiratory Treatment   Throughout All Fields Post-Treatment All Fields   Throughout All Fields Post-Treatment no change   Post-treatment Heart Rate (beats/min) 88   Post-treatment Resp Rate (breaths/min) 16

## 2019-01-26 NOTE — ASSESSMENT & PLAN NOTE
Present prior to and at time of arrival- resolved.  No acute electrolyte abnormalities.  Monitor on telemetry.  Cardiology to see.

## 2019-01-26 NOTE — H&P
[facility] Hospital Medicine  History & Physical    Patient Name: Brandon Shahid Jr.  MRN: [id number]  Admission Date: 1/25/2019  Attending Physician: Soha Ballard MD   Primary Care Provider: Tonya Saldaña NP         Patient information was obtained from patient, past medical records and ER records.     Subjective:     Principal Problem:Chest pain    Chief Complaint:   Chief Complaint   Patient presents with    Abnormal ECG     saw PCP for chest pain. EKG showed NSR with PVC's. sent for further evaluation.        HPI: Brandon Shahid is a 52 yo male with PMHx significant for COPD, blindness, thyroid disease, trigeminal neuralgia, HLD, and depression. He presented to the ED from the urgent care for further evaluation of chest pain.  He presented to his PCP with c/o chest pain that per patient began months ago, but has worsened.  Pain is mid-sternal and waxes and wanes in severity.  Pain sometimes seems related to cough.  He states he has been suffering from increased SOB, cough with mucous production, and wheezing for several weeks.  He reports minimal improvement with home inhaler.  He had an abnormal EKG with trigeminy at PCP and was referred to ED.  Patient  Reports SOB is worse with exertion but that CP has been coming and going intermittently for months with no exacerbating or alleviating factors and no radiation.  He continues to smoke 1.5 ppd.  He endorses some nausea and occasional emesis after eating.  He denies any abdominal pain, urinary complaints, or leg swelling.  He states his mother had a massive MI in her 40s.  He denies any historical cardiac work up.  Other pertinent medical history as below:    Past Medical History:   Diagnosis Date    Blind     Chronic back pain     COPD (chronic obstructive pulmonary disease)     Depression     Emphysema of lung     Hypercholesteremia     Hypothyroid     Trigeminal neuralgia        Past Surgical History:   Procedure Laterality Date     APPENDECTOMY      HEMORRHOID SURGERY         Review of patient's allergies indicates:  No Known Allergies    No current facility-administered medications on file prior to encounter.      Current Outpatient Medications on File Prior to Encounter   Medication Sig    acetaminophen (TYLENOL EXTRA STRENGTH) 500 MG tablet Take 500 mg by mouth every 8 (eight) hours as needed (pain).     albuterol (PROVENTIL HFA) 90 mcg/actuation inhaler Inhale 2 puffs into the lungs every 6 (six) hours as needed for Wheezing. Rescue    atorvastatin (LIPITOR) 40 MG tablet Take 40 mg by mouth once daily.    citalopram (CELEXA) 40 MG tablet Take 40 mg by mouth once daily.    fluticasone (FLONASE) 50 mcg/actuation nasal spray 1 spray by Each Nare route once daily.    gabapentin (NEURONTIN) 800 MG tablet Take 800 mg by mouth 3 (three) times daily.    levothyroxine (SYNTHROID) 25 MCG tablet Take 25 mcg by mouth once daily.    methocarbamol (ROBAXIN) 500 MG Tab Take 500 mg by mouth 4 (four) times daily as needed (muscle spasm).     ranitidine (ACID REDUCER, RANITIDINE,) 150 MG tablet Take 150 mg by mouth once daily.      Family History     Problem Relation (Age of Onset)    Heart disease Mother    No Known Problems Father        Tobacco Use    Smoking status: Current Every Day Smoker     Packs/day: 1.50     Types: Cigarettes    Smokeless tobacco: Never Used   Substance and Sexual Activity    Alcohol use: No     Frequency: Never    Drug use: No    Sexual activity: Not on file     Review of Systems   Constitutional: Positive for chills. Negative for activity change, appetite change and fatigue.   HENT: Negative for congestion, postnasal drip, sore throat and trouble swallowing.    Eyes: Positive for visual disturbance (blind).   Respiratory: Positive for cough, shortness of breath and wheezing. Negative for chest tightness.    Cardiovascular: Positive for chest pain. Negative for palpitations and leg swelling.   Gastrointestinal:  Positive for constipation, diarrhea, nausea and vomiting. Negative for abdominal pain.   Genitourinary: Negative for difficulty urinating, dysuria, flank pain and hematuria.   Musculoskeletal: Negative for arthralgias and myalgias.   Skin: Negative for color change.   Neurological: Positive for headaches (chronic HA issues). Negative for dizziness, syncope, weakness and light-headedness.   Psychiatric/Behavioral: Negative for confusion. The patient is not nervous/anxious.      Objective:     Vital Signs (Most Recent):  Temp: 96.4 °F (35.8 °C) (01/25/19 1955)  Pulse: 81 (01/25/19 1955)  Resp: 19 (01/25/19 1955)  BP: 113/72 (01/25/19 1955)  SpO2: 95 % (01/25/19 1955) Vital Signs (24h Range):  Temp:  [96.4 °F (35.8 °C)-97.8 °F (36.6 °C)] 96.4 °F (35.8 °C)  Pulse:  [63-92] 81  Resp:  [18-19] 19  SpO2:  [95 %-99 %] 95 %  BP: (113-137)/(65-88) 113/72     Weight: 95.1 kg (209 lb 10.5 oz)  Body mass index is 28.43 kg/m².    Physical Exam   Constitutional: He is oriented to person, place, and time. He appears well-developed and well-nourished. No distress.   HENT:   Head: Normocephalic and atraumatic.   Eyes: Conjunctivae are normal.   Blind     Neck: Normal range of motion. Neck supple.   Cardiovascular: Normal rate, regular rhythm, normal heart sounds and intact distal pulses.   No murmur heard.  Pulmonary/Chest: Effort normal. No respiratory distress.   Frequent coughing; diminished breath sounds   Abdominal: Soft. Bowel sounds are normal. He exhibits no distension.   Musculoskeletal: Normal range of motion. He exhibits no edema or tenderness.   Neurological: He is alert and oriented to person, place, and time. No cranial nerve deficit.   Skin: Skin is warm and dry. Capillary refill takes less than 2 seconds. No erythema.   Psychiatric: He has a normal mood and affect. His behavior is normal. Judgment and thought content normal.           Significant Labs:   CBC:   Recent Labs   Lab 01/25/19  1452   WBC 13.20*   HGB 14.8    HCT 45.3        CMP:   Recent Labs   Lab 01/25/19  1452      K 4.3      CO2 24   GLU 98   BUN 14   CREATININE 0.9   CALCIUM 9.4   PROT 6.8   ALBUMIN 3.6   BILITOT 0.3   ALKPHOS 80   AST 12   ALT 15   ANIONGAP 10   EGFRNONAA >60     Troponin:   Recent Labs   Lab 01/25/19  1452 01/25/19  1703   TROPONINI 0.009 0.007       Significant Imaging:   EKG: I reviewed. NSR 83 with frequent PVCs, no ischemic changes noted.     CXR: No acute abnormality.    Assessment/Plan:     * Chest pain    Atypical.  Reproducible on exam and possibly pleuritic 2/2 COPD exacerbation.  Patient has several risk factors.  Will monitor on telemetry, trend troponin, keep NPO for now, and consult with cardiology in AM.        Trigeminy    Present prior to and at time of arrival- resolved.  No acute electrolyte abnormalities.  Monitor on telemetry.  Cardiology to see.        Chronic obstructive pulmonary disease with acute exacerbation    Continue IV steroids, bronchodilators, and O2 as required to maintain sats > 92%.  Add azithromycin.  Discussed need to stop smoking.       Acquired hypothyroidism    Check TSH.  Continue levothyroxine and adjust as clinically necessary.       Trigeminal neuralgia    Chronic issue, continue gabapentin.       Episode of recurrent major depressive disorder    Chronic issue- well-controlled on current medication.  Continue citalopram.       Legally blind    Maintain fall precautions and assist patient as required in ADLs.       Tobacco use    Health hazards associated with cigarette smoking were reviewed with patient and cessation was encouraged. Nicotine replacement and counseling options were discussed.  Spent 3 minutes counseling on cessation, patient knows he needs to quit.  He refuses nicotine patch.           VTE Risk Mitigation (From admission, onward)        Ordered     Place RAFA hose  Until discontinued      01/26/19 0053     Place sequential compression device  Until discontinued       01/26/19 0053     IP VTE LOW RISK PATIENT  Once      01/25/19 1811             Vilma Flynn NP  Department of Hospital Medicine   [facility]

## 2019-01-26 NOTE — ASSESSMENT & PLAN NOTE
Continue IV steroids, bronchodilators, and O2 as required to maintain sats > 92%.  Add azithromycin.  Discussed need to stop smoking.

## 2019-01-26 NOTE — HPI
Brandon Shahid is a 52 yo male with PMHx significant for COPD, blindness, thyroid disease, trigeminal neuralgia, HLD, and depression. He presented to the ED from the urgent care for further evaluation of chest pain.  He presented to his PCP with c/o chest pain that per patient began months ago, but has worsened.  Pain is mid-sternal and waxes and wanes in severity.  Pain sometimes seems related to cough.  He states he has been suffering from increased SOB, cough with mucous production, and wheezing for several weeks.  He reports minimal improvement with home inhaler.  He had an abnormal EKG with trigeminy at PCP and was referred to ED.  Patient  Reports SOB is worse with exertion but that CP has been coming and going intermittently for months with no exacerbating or alleviating factors and no radiation.  He continues to smoke 1.5 ppd.  He endorses some nausea and occasional emesis after eating.  He denies any abdominal pain, urinary complaints, or leg swelling.  He states his mother had a massive MI in her 40s.  He denies any historical cardiac work up.  Other pertinent medical history as below:

## 2019-01-26 NOTE — ASSESSMENT & PLAN NOTE
Atypical.  Reproducible on exam and possibly pleuritic 2/2 COPD exacerbation.  Patient has several risk factors.  Will monitor on telemetry, trend troponin, keep NPO for now, and consult with cardiology in AM.

## 2019-01-26 NOTE — SUBJECTIVE & OBJECTIVE
Interval History: no new issues overnight. Still with intermittent chest pain and palpations. Denies shortness of breath. Family at bedside. Awaiting cardiology consult    Review of Systems   Constitutional: Positive for activity change. Negative for appetite change and diaphoresis.   HENT: Negative for sore throat.    Eyes: Positive for visual disturbance (legally blind).   Respiratory: Negative for cough, shortness of breath and wheezing.    Cardiovascular: Positive for chest pain and palpitations. Negative for leg swelling.   Gastrointestinal: Negative for abdominal pain and nausea.   Musculoskeletal: Negative for arthralgias.   Neurological: Negative for dizziness, syncope and light-headedness.     Objective:     Vital Signs (Most Recent):  Temp: 97 °F (36.1 °C) (01/26/19 1123)  Pulse: 77 (01/26/19 1123)  Resp: 20 (01/26/19 1123)  BP: 112/69 (01/26/19 1123)  SpO2: (!) 93 % (01/26/19 1123) Vital Signs (24h Range):  Temp:  [96.4 °F (35.8 °C)-97.6 °F (36.4 °C)] 97 °F (36.1 °C)  Pulse:  [63-95] 77  Resp:  [15-20] 20  SpO2:  [93 %-99 %] 93 %  BP: (112-137)/(68-88) 112/69     Weight: 95.1 kg (209 lb 10.5 oz)  Body mass index is 28.43 kg/m².    Intake/Output Summary (Last 24 hours) at 1/26/2019 1243  Last data filed at 1/26/2019 0500  Gross per 24 hour   Intake 260 ml   Output --   Net 260 ml      Physical Exam   Constitutional: He is oriented to person, place, and time. He appears well-developed and well-nourished.   HENT:   Head: Normocephalic and atraumatic.   Nose: Nose normal.   Mouth/Throat: Oropharynx is clear and moist.   Eyes:   Eyes closed. Legally blind bilaterally    Neck: Normal range of motion. Neck supple.   Cardiovascular: Normal rate, regular rhythm and intact distal pulses.   Murmur (ectopy) heard.  Pulmonary/Chest: Effort normal and breath sounds normal. He has no wheezes.   Abdominal: Soft. Bowel sounds are normal. There is no tenderness.   Musculoskeletal: Normal range of motion.   Neurological: He  is alert and oriented to person, place, and time.   Skin: Skin is warm and dry.   Psychiatric: He has a normal mood and affect. His behavior is normal.   Nursing note and vitals reviewed.      Significant Labs:   BMP:   Recent Labs   Lab 01/26/19  0309   *      K 4.7      CO2 22*   BUN 14   CREATININE 1.0   CALCIUM 9.9   MG 2.3     CBC:   Recent Labs   Lab 01/25/19  1452 01/26/19  0309   WBC 13.20* 9.20   HGB 14.8 15.3   HCT 45.3 46.8    335       Significant Imaging: I have reviewed and interpreted all pertinent imaging results/findings within the past 24 hours.

## 2019-01-27 VITALS
RESPIRATION RATE: 16 BRPM | SYSTOLIC BLOOD PRESSURE: 123 MMHG | HEART RATE: 71 BPM | TEMPERATURE: 96 F | DIASTOLIC BLOOD PRESSURE: 84 MMHG | BODY MASS INDEX: 28.4 KG/M2 | HEIGHT: 72 IN | WEIGHT: 209.69 LBS | OXYGEN SATURATION: 96 %

## 2019-01-27 PROCEDURE — 48100173 SMH CATH LAB CHARGE

## 2019-01-27 PROCEDURE — C1769 GUIDE WIRE: HCPCS

## 2019-01-27 PROCEDURE — C1893 INTRO/SHEATH, FIXED,NON-PEEL: HCPCS

## 2019-01-27 PROCEDURE — 99153 MOD SED SAME PHYS/QHP EA: CPT

## 2019-01-27 PROCEDURE — 25000003 PHARM REV CODE 250: Performed by: EMERGENCY MEDICINE

## 2019-01-27 PROCEDURE — 93458 L HRT ARTERY/VENTRICLE ANGIO: CPT

## 2019-01-27 PROCEDURE — C1760 CLOSURE DEV, VASC: HCPCS

## 2019-01-27 PROCEDURE — 93567 NJX CAR CTH SPRVLV AORTGRPHY: CPT

## 2019-01-27 PROCEDURE — 25000003 PHARM REV CODE 250: Performed by: SPECIALIST

## 2019-01-27 PROCEDURE — 99152 MOD SED SAME PHYS/QHP 5/>YRS: CPT

## 2019-01-27 RX ORDER — BENZONATATE 200 MG/1
200 CAPSULE ORAL 3 TIMES DAILY PRN
Qty: 30 CAPSULE | Refills: 0 | Status: SHIPPED | OUTPATIENT
Start: 2019-01-27 | End: 2019-02-06

## 2019-01-27 RX ORDER — ATORVASTATIN CALCIUM 80 MG/1
80 TABLET, FILM COATED ORAL DAILY
Qty: 90 TABLET | Refills: 3 | Status: SHIPPED | OUTPATIENT
Start: 2019-01-27 | End: 2020-01-27

## 2019-01-27 RX ORDER — AZITHROMYCIN 250 MG/1
250 TABLET, FILM COATED ORAL DAILY
Qty: 3 TABLET | Refills: 0 | Status: SHIPPED | OUTPATIENT
Start: 2019-01-27

## 2019-01-27 RX ORDER — SODIUM CHLORIDE 450 MG/100ML
INJECTION, SOLUTION INTRAVENOUS CONTINUOUS
Status: DISCONTINUED | OUTPATIENT
Start: 2019-01-27 | End: 2019-01-27 | Stop reason: HOSPADM

## 2019-01-27 RX ORDER — PREDNISONE 20 MG/1
40 TABLET ORAL DAILY
Qty: 6 TABLET | Refills: 0 | Status: SHIPPED | OUTPATIENT
Start: 2019-01-27 | End: 2019-01-30

## 2019-01-27 RX ORDER — ASPIRIN 81 MG/1
81 TABLET ORAL DAILY
Refills: 0 | COMMUNITY
Start: 2019-01-27 | End: 2020-01-27

## 2019-01-27 RX ADMIN — LEVOTHYROXINE SODIUM 25 MCG: 25 TABLET ORAL at 05:01

## 2019-01-27 RX ADMIN — SODIUM CHLORIDE: 0.45 INJECTION, SOLUTION INTRAVENOUS at 05:01

## 2019-01-27 RX ADMIN — GABAPENTIN 800 MG: 400 CAPSULE ORAL at 03:01

## 2019-01-27 NOTE — NURSING
Report called to Saida at [facility].  Pt will soon be transported to [facility] for angiogram.  Has been NPO since MN.  No complaints of CP/SOB voiced at this time.  Belongings sent with pt.

## 2019-01-27 NOTE — PLAN OF CARE
Problem: Adult Inpatient Plan of Care  Goal: Plan of Care Review  Outcome: Ongoing (interventions implemented as appropriate)  Pt returned from [facility]. Right groin looks good no hematoma, redness, or bleeding. Pt ambulating in hallway with wife.

## 2019-01-27 NOTE — DISCHARGE INSTRUCTIONS
No tub baths, spa, or pools for the next 3 days.  Monitor right groin for redness and drainage.   Sedentary activity for the next three days.  No lifting over 30 pounds for the next week.   Wash site with antibacterial soap and pat dry.

## 2019-01-27 NOTE — NURSING
Pending bed alfa @ [facility] is 0416 and the number to call report is [phone].  Please call when [facility] arrives for transport.

## 2019-01-27 NOTE — PROGRESS NOTES
01/26/19 5026   Patient Assessment/Suction   Level of Consciousness (AVPU) alert   Respiratory Effort Normal;Unlabored   Expansion/Accessory Muscles/Retractions no use of accessory muscles;no retractions;expansion symmetric   All Lung Fields Breath Sounds diminished   Cough Frequency infrequent   Cough Type good;nonproductive   PRE-TX-O2   O2 Device (Oxygen Therapy) room air   SpO2 (!) 93 %   Pulse Oximetry Type Intermittent   Pulse 75   Resp 17   Aerosol Therapy   $ Aerosol Therapy Charges Aerosol Treatment   Respiratory Treatment Status (SVN) given   Treatment Route (SVN) mask   Patient Position (SVN) HOB elevated   Post Treatment Assessment (SVN) breath sounds unchanged   Signs of Intolerance (SVN) none   Breath Sounds Post-Respiratory Treatment   Throughout All Fields Post-Treatment no change   Post-treatment Heart Rate (beats/min) 81   Post-treatment Resp Rate (breaths/min) 18

## 2019-01-27 NOTE — NURSING
Received call from [facility] House Supervisor (Jorge/Jaime) that Dr. Bee will do Cath lab procedure @ 8am on 1/27/19.  The pt needs to be at [facility] for 7am.  The [facility] has been notified and [facility]  Notified C. Pallagao, Charge nurse.

## 2019-01-28 ENCOUNTER — PATIENT OUTREACH (OUTPATIENT)
Dept: ADMINISTRATIVE | Facility: CLINIC | Age: 54
End: 2019-01-28

## 2019-01-28 NOTE — HOSPITAL COURSE
Patient monitored closely during hospitalization. Telemetry with episodes of trigeminy. Cardiology consulted. He was recommended for coronary angiogram and was transferred to [facility] for procedure. Left Heart cath with no significant stenosis and did not require PCI. Lipid panel obtained and LDL >180. Home statin increased to 80 mg daily. Smoking cessation. He is stable for DC from cardiology standpoint.

## 2019-01-28 NOTE — DISCHARGE SUMMARY
[facility] Hospital Medicine  Discharge Summary      Patient Name: Brandon Shahid Jr.  MRN: [id number]  Admission Date: 1/25/2019  Hospital Length of Stay: 2 days  Discharge Date and Time: 1/27/2019  3:46 PM  Attending Physician: No att. providers found   Discharging Provider: Pauline Leon NP  Primary Care Provider: Tonya Saldaña NP      HPI:   Brandon Shahid is a 54 yo male with PMHx significant for COPD, blindness, thyroid disease, trigeminal neuralgia, HLD, and depression. He presented to the ED from the urgent care for further evaluation of chest pain.  He presented to his PCP with c/o chest pain that per patient began months ago, but has worsened.  Pain is mid-sternal and waxes and wanes in severity.  Pain sometimes seems related to cough.  He states he has been suffering from increased SOB, cough with mucous production, and wheezing for several weeks.  He reports minimal improvement with home inhaler.  He had an abnormal EKG with trigeminy at PCP and was referred to ED.  Patient  Reports SOB is worse with exertion but that CP has been coming and going intermittently for months with no exacerbating or alleviating factors and no radiation.  He continues to smoke 1.5 ppd.  He endorses some nausea and occasional emesis after eating.  He denies any abdominal pain, urinary complaints, or leg swelling.  He states his mother had a massive MI in her 40s.  He denies any historical cardiac work up.  Other pertinent medical history as below:    * No surgery found *      Hospital Course:   Patient monitored closely during hospitalization. Telemetry with episodes of trigeminy. Cardiology consulted. He was recommended for coronary angiogram and was transferred to [facility] for procedure. Left Heart cath with no significant stenosis and did not require PCI. Lipid panel obtained and LDL >180. Home statin increased to 80 mg daily. Smoking cessation. He is stable for DC from cardiology standpoint.           Consults:     No new Assessment & Plan notes have been filed under this hospital service since the last note was generated.  Service: Hospital Medicine    Final Active Diagnoses:    Diagnosis Date Noted POA    PRINCIPAL PROBLEM:  Trigeminy [R00.8] 01/25/2019 Yes    Acquired hypothyroidism [E03.9] 01/26/2019 Yes    Chest pain [R07.9] 01/25/2019 Yes    Tobacco dependency [F17.200] 01/25/2019 Yes    Legally blind [H54.8] 01/25/2019 Yes    Chronic obstructive pulmonary disease with acute exacerbation [J44.1] 01/25/2019 Yes    Episode of recurrent major depressive disorder [F33.9] 01/25/2019 Yes    Trigeminal neuralgia [G50.0] 01/25/2019 Yes      Problems Resolved During this Admission:       Discharged Condition: stable    Disposition: Home or Self Care    Follow Up:  Follow-up Information     Tonya Saldaña NP In 1 week.    Specialty:  Family Medicine  Why:  hospital follow up  Contact information:  [address] [facility] [address]  [phone]             Jeremy Bee MD In 2 weeks.    Specialty:  Cardiology  Contact information:  [address] [facility] [address]  [phone]                 Patient Instructions:      Diet Cardiac     Diet diabetic     Notify your health care provider if you experience any of the following:  redness, tenderness, or signs of infection (pain, swelling, redness, odor or green/yellow discharge around incision site)     Notify your health care provider if you experience any of the following:  severe uncontrolled pain     Notify your health care provider if you experience any of the following:  persistent nausea and vomiting or diarrhea     Notify your health care provider if you experience any of the following:  difficulty breathing or increased cough     Notify your health care provider if you experience any of the following:  severe persistent headache     Notify your health care provider if you experience any of the following:   worsening rash     Notify your health care provider if you experience any of the following:  persistent dizziness, light-headedness, or visual disturbances     Activity as tolerated       Significant Diagnostic Studies: Labs: BMP: No results for input(s): GLU, NA, K, CL, CO2, BUN, CREATININE, CALCIUM, MG in the last 48 hours. and CMP No results for input(s): NA, K, CL, CO2, GLU, BUN, CREATININE, CALCIUM, PROT, ALBUMIN, BILITOT, ALKPHOS, AST, ALT, ANIONGAP, ESTGFRAFRICA, EGFRNONAA in the last 48 hours.    Pending Diagnostic Studies:     None         Medications:  Reconciled Home Medications:      Medication List      START taking these medications    aspirin 81 MG EC tablet  Commonly known as:  ECOTRIN  Take 1 tablet (81 mg total) by mouth once daily.     azithromycin 250 MG tablet  Commonly known as:  Z-IMKEY  Take 1 tablet (250 mg total) by mouth once daily.     benzonatate 200 MG capsule  Commonly known as:  TESSALON  Take 1 capsule (200 mg total) by mouth 3 (three) times daily as needed for Cough.     predniSONE 20 MG tablet  Commonly known as:  DELTASONE  Take 2 tablets (40 mg total) by mouth once daily. for 3 days        CHANGE how you take these medications    atorvastatin 80 MG tablet  Commonly known as:  LIPITOR  Take 1 tablet (80 mg total) by mouth once daily.  What changed:    · medication strength  · how much to take        CONTINUE taking these medications    Acid Reducer (raNITIdine) 150 MG tablet  Generic drug:  ranitidine  Take 150 mg by mouth once daily.     citalopram 40 MG tablet  Commonly known as:  CELEXA  Take 40 mg by mouth once daily.     fluticasone 50 mcg/actuation nasal spray  Commonly known as:  FLONASE  1 spray by Each Nare route once daily.     gabapentin 800 MG tablet  Commonly known as:  NEURONTIN  Take 800 mg by mouth 3 (three) times daily.     levothyroxine 25 MCG tablet  Commonly known as:  SYNTHROID  Take 25 mcg by mouth once daily.     methocarbamol 500 MG Tab  Commonly known as:   ROBAXIN  Take 500 mg by mouth 4 (four) times daily as needed (muscle spasm).     PROVENTIL HFA 90 mcg/actuation inhaler  Generic drug:  albuterol  Inhale 2 puffs into the lungs every 6 (six) hours as needed for Wheezing. Rescue     TYLENOL EXTRA STRENGTH 500 MG tablet  Generic drug:  acetaminophen  Take 500 mg by mouth every 8 (eight) hours as needed (pain).            Indwelling Lines/Drains at time of discharge:   Lines/Drains/Airways          None          Time spent on the discharge of patient: 30 minutes  Patient was seen and examined on the date of discharge and determined to be suitable for discharge.         Pauline Leon NP  Department of Hospital Medicine  [facility]

## 2019-01-28 NOTE — PROGRESS NOTES
C3 nurse attempted to contact patient. No answer. The following message was left for the patient to return the call:  Good afternoon I am a nurse calling on behalf of [facility] from the [facility].  This is a Transitional Care Call for Brandon Shahid Jr.. When you have a moment please contact us at [phone] or [phone] Monday through Friday, between the hours of 8 am to 4 pm. We look forward to speaking with you. On behalf of [facility] have a nice day.    The patient does not have a scheduled HOSFU appointment within 7-14 days post hospital discharge date 1/27/19. Message sent to Physician staff to assist with HOSFU appointment scheduling.

## 2019-01-29 ENCOUNTER — TELEPHONE (OUTPATIENT)
Dept: MEDSURG UNIT | Facility: HOSPITAL | Age: 54
End: 2019-01-29

## 2019-01-29 ENCOUNTER — TELEPHONE (OUTPATIENT)
Dept: PHARMACY | Facility: AMBULARY SURGERY CENTER | Age: 54
End: 2019-01-29

## 2019-01-29 NOTE — PATIENT INSTRUCTIONS
Recognizing and Treating Wound Infection  Wounds can become infected with harmful bacteria. This prevents healing and increases the risk of scars. In some cases, the infection may spread to other parts of the body. And infection with the bacteria that cause tetanus can be fatal. Know what to watch for and get prompt treatment for infection.     Risk Factors  A wound is more likely to become infected if it:  Results from a puncture, such as from a nail or piece of glass.  Results from a human or animal bite.  Isn't cleaned or treated within 8 hours.  Occurs in your hand, foot, leg, armpit, or groin.  Contains dirt or saliva.  Heals very slowly.  Occurs in a person with diabetes, alcoholism, or a compromised immune system.    Symptoms of Infection  Call your healthcare provider at the first sign of infection, such as:  Yellow, yellow-green, or foul-smelling drainage from a wound  Increased pain, swelling, or redness in or near a wound  A change in the color or size of a wound  Red streaks in the skin around the wound  Fever    Treatment  Treatment is likely to depend on the type and extent of your infection. Your healthcare provider may prescribe oral antibiotics to help fight bacteria. He or she may also flush the wound with an antibiotic solution or apply an antibiotic ointment. Sometimes an abscess (a pocket of pus) may form. In that case, the abscess will be opened and the fluid drained. You may need hospital care if the infection is very severe.    Preventing Wound Infection  Follow these steps to help keep wounds from developing infection:  Wash the wound right away with soap and water.  Apply a small amount of antibiotic ointment. You can buy this at the drugstore without a prescription.  Cover wounds with a bandage or gauze dressing.  Keep the wound clean and dry for the first 24hrs  Change the dressing daily using sterile gloves  © 5668-3765 [employer], [address] [place] [address], [place] [address]. All  rights reserved. This information is not intended as a substitute for professional medical care. Always follow your healthcare professional's instructions.

## 2021-01-04 ENCOUNTER — TELEPHONE (OUTPATIENT)
Dept: FAMILY MEDICINE | Facility: CLINIC | Age: 56
End: 2021-01-04

## 2021-01-04 DIAGNOSIS — E03.9 HYPOTHYROIDISM, UNSPECIFIED TYPE: ICD-10-CM

## 2021-01-04 DIAGNOSIS — M10.9 GOUTY ARTHROPATHY: ICD-10-CM

## 2021-01-04 DIAGNOSIS — I10 ELEVATED BLOOD PRESSURE READING IN OFFICE WITH DIAGNOSIS OF HYPERTENSION: ICD-10-CM

## 2021-01-04 DIAGNOSIS — E78.5 HYPERLIPIDEMIA, UNSPECIFIED HYPERLIPIDEMIA TYPE: ICD-10-CM

## 2021-01-04 DIAGNOSIS — R53.83 FATIGUE, UNSPECIFIED TYPE: Primary | ICD-10-CM

## 2021-01-05 ENCOUNTER — OFFICE VISIT (OUTPATIENT)
Dept: FAMILY MEDICINE | Facility: CLINIC | Age: 56
End: 2021-01-05
Payer: MEDICARE

## 2021-01-05 VITALS
HEART RATE: 102 BPM | OXYGEN SATURATION: 99 % | WEIGHT: 239 LBS | HEIGHT: 72 IN | DIASTOLIC BLOOD PRESSURE: 74 MMHG | BODY MASS INDEX: 32.37 KG/M2 | TEMPERATURE: 97 F | SYSTOLIC BLOOD PRESSURE: 120 MMHG

## 2021-01-05 DIAGNOSIS — J43.9 PULMONARY EMPHYSEMA, UNSPECIFIED EMPHYSEMA TYPE: ICD-10-CM

## 2021-01-05 DIAGNOSIS — E78.5 HYPERLIPIDEMIA, UNSPECIFIED HYPERLIPIDEMIA TYPE: ICD-10-CM

## 2021-01-05 DIAGNOSIS — R53.83 FATIGUE, UNSPECIFIED TYPE: ICD-10-CM

## 2021-01-05 DIAGNOSIS — J44.9 CHRONIC OBSTRUCTIVE PULMONARY DISEASE, UNSPECIFIED COPD TYPE: ICD-10-CM

## 2021-01-05 DIAGNOSIS — M67.922 DISORDER OF TENDON OF LEFT BICEPS: ICD-10-CM

## 2021-01-05 DIAGNOSIS — R73.09 ABNORMAL GLUCOSE: ICD-10-CM

## 2021-01-05 DIAGNOSIS — Z12.5 SCREENING FOR PROSTATE CANCER: ICD-10-CM

## 2021-01-05 DIAGNOSIS — Z11.59 ENCOUNTER FOR HEPATITIS C SCREENING TEST FOR LOW RISK PATIENT: ICD-10-CM

## 2021-01-05 DIAGNOSIS — Z00.00 ENCOUNTER FOR ANNUAL WELLNESS EXAM IN MEDICARE PATIENT: ICD-10-CM

## 2021-01-05 DIAGNOSIS — I10 ELEVATED BLOOD PRESSURE READING IN OFFICE WITH DIAGNOSIS OF HYPERTENSION: ICD-10-CM

## 2021-01-05 DIAGNOSIS — E03.9 HYPOTHYROIDISM, UNSPECIFIED TYPE: Primary | ICD-10-CM

## 2021-01-05 PROCEDURE — 99214 OFFICE O/P EST MOD 30 MIN: CPT | Mod: S$GLB,,,

## 2021-01-05 PROCEDURE — 99214 PR OFFICE/OUTPT VISIT, EST, LEVL IV, 30-39 MIN: ICD-10-PCS | Mod: S$GLB,,,

## 2021-01-05 RX ORDER — TRIAMCINOLONE ACETONIDE 1 MG/G
CREAM TOPICAL
COMMUNITY
Start: 2020-12-03

## 2021-01-05 RX ORDER — METHOCARBAMOL 750 MG/1
750 TABLET, FILM COATED ORAL 3 TIMES DAILY
Qty: 90 TABLET | Refills: 5 | Status: SHIPPED | OUTPATIENT
Start: 2021-01-05 | End: 2021-04-06 | Stop reason: SDUPTHER

## 2021-01-05 RX ORDER — GABAPENTIN 800 MG/1
800 TABLET ORAL 3 TIMES DAILY
COMMUNITY
Start: 2020-12-01 | End: 2021-01-05 | Stop reason: SDUPTHER

## 2021-01-05 RX ORDER — MONTELUKAST SODIUM 10 MG/1
10 TABLET ORAL NIGHTLY
COMMUNITY
End: 2021-01-05 | Stop reason: SDUPTHER

## 2021-01-05 RX ORDER — METHOCARBAMOL 750 MG/1
750 TABLET, FILM COATED ORAL 3 TIMES DAILY
COMMUNITY
Start: 2020-12-01 | End: 2021-01-05 | Stop reason: SDUPTHER

## 2021-01-05 RX ORDER — FLUTICASONE PROPIONATE AND SALMETEROL 50; 250 UG/1; UG/1
1 POWDER RESPIRATORY (INHALATION) 2 TIMES DAILY
COMMUNITY
Start: 2020-12-01 | End: 2021-01-05 | Stop reason: SDUPTHER

## 2021-01-05 RX ORDER — FLUTICASONE PROPIONATE AND SALMETEROL 50; 250 UG/1; UG/1
1 POWDER RESPIRATORY (INHALATION) 2 TIMES DAILY
Qty: 180 EACH | Refills: 3 | Status: SHIPPED | OUTPATIENT
Start: 2021-01-05 | End: 2021-04-06 | Stop reason: SDUPTHER

## 2021-01-05 RX ORDER — OMEPRAZOLE 40 MG/1
40 CAPSULE, DELAYED RELEASE ORAL DAILY
COMMUNITY
Start: 2020-12-03 | End: 2021-01-05 | Stop reason: SDUPTHER

## 2021-01-05 RX ORDER — FLUTICASONE PROPIONATE 50 MCG
1 SPRAY, SUSPENSION (ML) NASAL 2 TIMES DAILY
COMMUNITY
Start: 2020-12-02 | End: 2021-04-06 | Stop reason: SDUPTHER

## 2021-01-05 RX ORDER — OMEPRAZOLE 40 MG/1
40 CAPSULE, DELAYED RELEASE ORAL DAILY
Qty: 90 CAPSULE | Refills: 3 | Status: SHIPPED | OUTPATIENT
Start: 2021-01-05 | End: 2021-04-06 | Stop reason: SDUPTHER

## 2021-01-05 RX ORDER — MONTELUKAST SODIUM 10 MG/1
10 TABLET ORAL NIGHTLY
Qty: 90 TABLET | Refills: 3 | Status: SHIPPED | OUTPATIENT
Start: 2021-01-05 | End: 2021-04-06 | Stop reason: SDUPTHER

## 2021-01-05 RX ORDER — GABAPENTIN 800 MG/1
800 TABLET ORAL 3 TIMES DAILY
Qty: 270 TABLET | Refills: 3 | Status: SHIPPED | OUTPATIENT
Start: 2021-01-05 | End: 2021-04-06 | Stop reason: SDUPTHER

## 2021-01-05 RX ORDER — LEVOTHYROXINE SODIUM 75 UG/1
75 TABLET ORAL
COMMUNITY
End: 2021-04-06 | Stop reason: SDUPTHER

## 2021-01-07 ENCOUNTER — HOSPITAL ENCOUNTER (OUTPATIENT)
Dept: RADIOLOGY | Facility: HOSPITAL | Age: 56
Discharge: HOME | End: 2021-01-07
Attending: NURSE PRACTITIONER
Payer: MEDICARE

## 2021-01-07 ENCOUNTER — TELEPHONE (OUTPATIENT)
Dept: ORTHOPEDICS | Facility: CLINIC | Age: 56
End: 2021-01-07

## 2021-01-07 DIAGNOSIS — M67.922 DISORDER OF TENDON OF LEFT BICEPS: ICD-10-CM

## 2021-01-07 PROCEDURE — 73030 XR SHOULDER COMPLETE 2 OR MORE VIEWS LEFT: ICD-10-PCS | Mod: 26,LT,, | Performed by: RADIOLOGY

## 2021-01-07 PROCEDURE — 73030 X-RAY EXAM OF SHOULDER: CPT | Mod: TC,PN,LT

## 2021-01-07 PROCEDURE — 73030 X-RAY EXAM OF SHOULDER: CPT | Mod: 26,LT,, | Performed by: RADIOLOGY

## 2021-01-08 DIAGNOSIS — N52.9 IMPOTENCE DUE TO ERECTILE DYSFUNCTION: Primary | ICD-10-CM

## 2021-01-08 RX ORDER — SILDENAFIL 25 MG/1
25 TABLET, FILM COATED ORAL DAILY PRN
Qty: 10 TABLET | Refills: 1 | Status: SHIPPED | OUTPATIENT
Start: 2021-01-08 | End: 2021-04-06 | Stop reason: SDUPTHER

## 2021-01-11 ENCOUNTER — TELEPHONE (OUTPATIENT)
Dept: FAMILY MEDICINE | Facility: CLINIC | Age: 56
End: 2021-01-11

## 2021-01-12 ENCOUNTER — TELEPHONE (OUTPATIENT)
Dept: FAMILY MEDICINE | Facility: CLINIC | Age: 56
End: 2021-01-12

## 2021-02-02 ENCOUNTER — TELEPHONE (OUTPATIENT)
Dept: FAMILY MEDICINE | Facility: CLINIC | Age: 56
End: 2021-02-02

## 2021-02-11 ENCOUNTER — TELEPHONE (OUTPATIENT)
Dept: FAMILY MEDICINE | Facility: CLINIC | Age: 56
End: 2021-02-11

## 2021-02-18 ENCOUNTER — TELEPHONE (OUTPATIENT)
Dept: FAMILY MEDICINE | Facility: CLINIC | Age: 56
End: 2021-02-18

## 2021-03-15 ENCOUNTER — TELEPHONE (OUTPATIENT)
Dept: FAMILY MEDICINE | Facility: CLINIC | Age: 56
End: 2021-03-15

## 2021-03-26 DIAGNOSIS — E78.2 MIXED HYPERLIPIDEMIA: Primary | ICD-10-CM

## 2021-03-26 DIAGNOSIS — F41.8 MIXED ANXIETY AND DEPRESSIVE DISORDER: ICD-10-CM

## 2021-03-26 RX ORDER — VILAZODONE HYDROCHLORIDE 20 MG/1
20 TABLET ORAL DAILY
COMMUNITY
End: 2021-03-26 | Stop reason: SDUPTHER

## 2021-03-26 RX ORDER — ATORVASTATIN CALCIUM 20 MG/1
20 TABLET, FILM COATED ORAL DAILY
Qty: 90 TABLET | Refills: 3 | Status: SHIPPED | OUTPATIENT
Start: 2021-03-26 | End: 2021-04-06 | Stop reason: SDUPTHER

## 2021-03-26 RX ORDER — VILAZODONE HYDROCHLORIDE 20 MG/1
20 TABLET ORAL DAILY
Qty: 30 TABLET | Refills: 5 | Status: SHIPPED | OUTPATIENT
Start: 2021-03-26 | End: 2021-04-06 | Stop reason: SDUPTHER

## 2021-04-06 ENCOUNTER — OFFICE VISIT (OUTPATIENT)
Dept: FAMILY MEDICINE | Facility: CLINIC | Age: 56
End: 2021-04-06
Payer: MEDICARE

## 2021-04-06 ENCOUNTER — TELEPHONE (OUTPATIENT)
Dept: FAMILY MEDICINE | Facility: CLINIC | Age: 56
End: 2021-04-06

## 2021-04-06 VITALS
OXYGEN SATURATION: 97 % | SYSTOLIC BLOOD PRESSURE: 144 MMHG | DIASTOLIC BLOOD PRESSURE: 98 MMHG | RESPIRATION RATE: 16 BRPM | HEART RATE: 100 BPM | WEIGHT: 237 LBS | HEIGHT: 72 IN | BODY MASS INDEX: 32.1 KG/M2 | TEMPERATURE: 98 F

## 2021-04-06 DIAGNOSIS — J44.9 CHRONIC OBSTRUCTIVE PULMONARY DISEASE, UNSPECIFIED COPD TYPE: ICD-10-CM

## 2021-04-06 DIAGNOSIS — D72.829 LEUKOCYTOSIS, UNSPECIFIED TYPE: ICD-10-CM

## 2021-04-06 DIAGNOSIS — R53.1 DECREASED STRENGTH, ENDURANCE, AND MOBILITY: ICD-10-CM

## 2021-04-06 DIAGNOSIS — Z74.09 DECREASED STRENGTH, ENDURANCE, AND MOBILITY: ICD-10-CM

## 2021-04-06 DIAGNOSIS — E78.2 MIXED HYPERLIPIDEMIA: ICD-10-CM

## 2021-04-06 DIAGNOSIS — E03.9 HYPOTHYROIDISM, UNSPECIFIED TYPE: ICD-10-CM

## 2021-04-06 DIAGNOSIS — I10 ESSENTIAL HYPERTENSION: ICD-10-CM

## 2021-04-06 DIAGNOSIS — F41.8 MIXED ANXIETY AND DEPRESSIVE DISORDER: ICD-10-CM

## 2021-04-06 DIAGNOSIS — K21.9 GASTROESOPHAGEAL REFLUX DISEASE WITHOUT ESOPHAGITIS: Primary | ICD-10-CM

## 2021-04-06 DIAGNOSIS — J30.89 NON-SEASONAL ALLERGIC RHINITIS, UNSPECIFIED TRIGGER: ICD-10-CM

## 2021-04-06 DIAGNOSIS — M62.838 MUSCLE SPASM: ICD-10-CM

## 2021-04-06 DIAGNOSIS — G62.9 NEUROPATHY: ICD-10-CM

## 2021-04-06 DIAGNOSIS — N52.9 IMPOTENCE DUE TO ERECTILE DYSFUNCTION: ICD-10-CM

## 2021-04-06 DIAGNOSIS — R68.89 DECREASED STRENGTH, ENDURANCE, AND MOBILITY: ICD-10-CM

## 2021-04-06 PROCEDURE — 99214 PR OFFICE/OUTPT VISIT, EST, LEVL IV, 30-39 MIN: ICD-10-PCS | Mod: S$GLB,,, | Performed by: NURSE PRACTITIONER

## 2021-04-06 PROCEDURE — 99214 OFFICE O/P EST MOD 30 MIN: CPT | Mod: S$GLB,,, | Performed by: NURSE PRACTITIONER

## 2021-04-06 RX ORDER — SILDENAFIL 25 MG/1
25 TABLET, FILM COATED ORAL DAILY PRN
Qty: 10 TABLET | Refills: 1 | Status: SHIPPED | OUTPATIENT
Start: 2021-04-06 | End: 2022-03-07 | Stop reason: SDUPTHER

## 2021-04-06 RX ORDER — OMEPRAZOLE 40 MG/1
40 CAPSULE, DELAYED RELEASE ORAL DAILY
Qty: 90 CAPSULE | Refills: 3 | Status: SHIPPED | OUTPATIENT
Start: 2021-04-06 | End: 2022-03-07 | Stop reason: SDUPTHER

## 2021-04-06 RX ORDER — FENOFIBRATE 160 MG/1
160 TABLET ORAL DAILY
Qty: 90 TABLET | Refills: 3 | Status: SHIPPED | OUTPATIENT
Start: 2021-04-06 | End: 2022-03-07 | Stop reason: SDUPTHER

## 2021-04-06 RX ORDER — LEVOTHYROXINE SODIUM 75 UG/1
75 TABLET ORAL
Qty: 90 TABLET | Refills: 3 | Status: SHIPPED | OUTPATIENT
Start: 2021-04-06 | End: 2022-03-07 | Stop reason: SDUPTHER

## 2021-04-06 RX ORDER — ATORVASTATIN CALCIUM 20 MG/1
20 TABLET, FILM COATED ORAL DAILY
Qty: 90 TABLET | Refills: 3 | Status: SHIPPED | OUTPATIENT
Start: 2021-04-06 | End: 2022-03-07 | Stop reason: SDUPTHER

## 2021-04-06 RX ORDER — GABAPENTIN 800 MG/1
800 TABLET ORAL 3 TIMES DAILY
Qty: 270 TABLET | Refills: 3 | Status: SHIPPED | OUTPATIENT
Start: 2021-04-06 | End: 2022-03-07 | Stop reason: SDUPTHER

## 2021-04-06 RX ORDER — VILAZODONE HYDROCHLORIDE 20 MG/1
20 TABLET ORAL DAILY
Qty: 30 TABLET | Refills: 5 | Status: SHIPPED | OUTPATIENT
Start: 2021-04-06 | End: 2022-03-07 | Stop reason: SDUPTHER

## 2021-04-06 RX ORDER — LISINOPRIL 10 MG/1
10 TABLET ORAL DAILY
Qty: 90 TABLET | Refills: 1 | Status: SHIPPED | OUTPATIENT
Start: 2021-04-06 | End: 2022-03-07

## 2021-04-06 RX ORDER — MONTELUKAST SODIUM 10 MG/1
10 TABLET ORAL NIGHTLY
Qty: 90 TABLET | Refills: 3 | Status: SHIPPED | OUTPATIENT
Start: 2021-04-06 | End: 2022-03-07 | Stop reason: SDUPTHER

## 2021-04-06 RX ORDER — FLUTICASONE PROPIONATE AND SALMETEROL 50; 250 UG/1; UG/1
1 POWDER RESPIRATORY (INHALATION) 2 TIMES DAILY
Qty: 180 EACH | Refills: 3 | Status: SHIPPED | OUTPATIENT
Start: 2021-04-06 | End: 2022-03-07 | Stop reason: SDUPTHER

## 2021-04-06 RX ORDER — METHOCARBAMOL 750 MG/1
750 TABLET, FILM COATED ORAL 3 TIMES DAILY
Qty: 90 TABLET | Refills: 5 | Status: SHIPPED | OUTPATIENT
Start: 2021-04-06 | End: 2022-03-07 | Stop reason: SDUPTHER

## 2021-04-06 RX ORDER — FLUTICASONE PROPIONATE 50 MCG
1 SPRAY, SUSPENSION (ML) NASAL 2 TIMES DAILY
Qty: 15.8 ML | Refills: 11 | Status: SHIPPED | OUTPATIENT
Start: 2021-04-06 | End: 2022-03-07 | Stop reason: SDUPTHER

## 2021-05-02 ENCOUNTER — PATIENT MESSAGE (OUTPATIENT)
Dept: ADMINISTRATIVE | Facility: HOSPITAL | Age: 56
End: 2021-05-02

## 2021-05-04 ENCOUNTER — LAB VISIT (OUTPATIENT)
Dept: LAB | Facility: CLINIC | Age: 56
End: 2021-05-04
Payer: MEDICARE

## 2021-05-04 DIAGNOSIS — R53.83 FATIGUE, UNSPECIFIED TYPE: ICD-10-CM

## 2021-05-04 DIAGNOSIS — E03.9 HYPOTHYROIDISM, UNSPECIFIED TYPE: ICD-10-CM

## 2021-05-04 DIAGNOSIS — I10 ELEVATED BLOOD PRESSURE READING IN OFFICE WITH DIAGNOSIS OF HYPERTENSION: ICD-10-CM

## 2021-05-04 DIAGNOSIS — D72.829 LEUKOCYTOSIS, UNSPECIFIED TYPE: ICD-10-CM

## 2021-05-04 DIAGNOSIS — E78.5 HYPERLIPIDEMIA, UNSPECIFIED HYPERLIPIDEMIA TYPE: ICD-10-CM

## 2021-05-04 LAB
ALBUMIN SERPL BCP-MCNC: 3.7 G/DL (ref 3.5–5.2)
ALP SERPL-CCNC: 64 U/L (ref 55–135)
ALT SERPL W/O P-5'-P-CCNC: 24 U/L (ref 10–44)
ANION GAP SERPL CALC-SCNC: 10 MMOL/L (ref 8–16)
AST SERPL-CCNC: 15 U/L (ref 10–40)
BASOPHILS # BLD AUTO: 0.17 K/UL (ref 0–0.2)
BASOPHILS # BLD AUTO: 0.17 K/UL (ref 0–0.2)
BASOPHILS NFR BLD: 1.1 % (ref 0–1.9)
BASOPHILS NFR BLD: 1.1 % (ref 0–1.9)
BILIRUB SERPL-MCNC: 0.2 MG/DL (ref 0.1–1)
BUN SERPL-MCNC: 10 MG/DL (ref 6–20)
CALCIUM SERPL-MCNC: 9.3 MG/DL (ref 8.7–10.5)
CHLORIDE SERPL-SCNC: 106 MMOL/L (ref 95–110)
CHOLEST SERPL-MCNC: 183 MG/DL (ref 120–199)
CHOLEST/HDLC SERPL: 4.9 {RATIO} (ref 2–5)
CO2 SERPL-SCNC: 24 MMOL/L (ref 23–29)
CREAT SERPL-MCNC: 0.9 MG/DL (ref 0.5–1.4)
DIFFERENTIAL METHOD: ABNORMAL
DIFFERENTIAL METHOD: ABNORMAL
EOSINOPHIL # BLD AUTO: 0 K/UL (ref 0–0.5)
EOSINOPHIL # BLD AUTO: 0 K/UL (ref 0–0.5)
EOSINOPHIL NFR BLD: 0 % (ref 0–8)
EOSINOPHIL NFR BLD: 0 % (ref 0–8)
ERYTHROCYTE [DISTWIDTH] IN BLOOD BY AUTOMATED COUNT: 13.7 % (ref 11.5–14.5)
ERYTHROCYTE [DISTWIDTH] IN BLOOD BY AUTOMATED COUNT: 13.7 % (ref 11.5–14.5)
EST. GFR  (AFRICAN AMERICAN): >60 ML/MIN/1.73 M^2
EST. GFR  (NON AFRICAN AMERICAN): >60 ML/MIN/1.73 M^2
GLUCOSE SERPL-MCNC: 108 MG/DL (ref 70–110)
HCT VFR BLD AUTO: 45.3 % (ref 40–54)
HCT VFR BLD AUTO: 45.3 % (ref 40–54)
HDLC SERPL-MCNC: 37 MG/DL (ref 40–75)
HDLC SERPL: 20.2 % (ref 20–50)
HGB BLD-MCNC: 15 G/DL (ref 14–18)
HGB BLD-MCNC: 15 G/DL (ref 14–18)
IMM GRANULOCYTES # BLD AUTO: 0.11 K/UL (ref 0–0.04)
IMM GRANULOCYTES # BLD AUTO: 0.11 K/UL (ref 0–0.04)
IMM GRANULOCYTES NFR BLD AUTO: 0.7 % (ref 0–0.5)
IMM GRANULOCYTES NFR BLD AUTO: 0.7 % (ref 0–0.5)
LDLC SERPL CALC-MCNC: 105.2 MG/DL (ref 63–159)
LYMPHOCYTES # BLD AUTO: 4.9 K/UL (ref 1–4.8)
LYMPHOCYTES # BLD AUTO: 4.9 K/UL (ref 1–4.8)
LYMPHOCYTES NFR BLD: 32.3 % (ref 18–48)
LYMPHOCYTES NFR BLD: 32.3 % (ref 18–48)
MCH RBC QN AUTO: 30.7 PG (ref 27–31)
MCH RBC QN AUTO: 30.7 PG (ref 27–31)
MCHC RBC AUTO-ENTMCNC: 33.1 G/DL (ref 32–36)
MCHC RBC AUTO-ENTMCNC: 33.1 G/DL (ref 32–36)
MCV RBC AUTO: 93 FL (ref 82–98)
MCV RBC AUTO: 93 FL (ref 82–98)
MONOCYTES # BLD AUTO: 0.8 K/UL (ref 0.3–1)
MONOCYTES # BLD AUTO: 0.8 K/UL (ref 0.3–1)
MONOCYTES NFR BLD: 5 % (ref 4–15)
MONOCYTES NFR BLD: 5 % (ref 4–15)
NEUTROPHILS # BLD AUTO: 9.2 K/UL (ref 1.8–7.7)
NEUTROPHILS # BLD AUTO: 9.2 K/UL (ref 1.8–7.7)
NEUTROPHILS NFR BLD: 60.9 % (ref 38–73)
NEUTROPHILS NFR BLD: 60.9 % (ref 38–73)
NONHDLC SERPL-MCNC: 146 MG/DL
NRBC BLD-RTO: 0 /100 WBC
NRBC BLD-RTO: 0 /100 WBC
PLATELET # BLD AUTO: 390 K/UL (ref 150–450)
PLATELET # BLD AUTO: 390 K/UL (ref 150–450)
PMV BLD AUTO: 10.3 FL (ref 9.2–12.9)
PMV BLD AUTO: 10.3 FL (ref 9.2–12.9)
POTASSIUM SERPL-SCNC: 4.2 MMOL/L (ref 3.5–5.1)
PROT SERPL-MCNC: 6.9 G/DL (ref 6–8.4)
RBC # BLD AUTO: 4.88 M/UL (ref 4.6–6.2)
RBC # BLD AUTO: 4.88 M/UL (ref 4.6–6.2)
SODIUM SERPL-SCNC: 140 MMOL/L (ref 136–145)
TRIGL SERPL-MCNC: 204 MG/DL (ref 30–150)
TSH SERPL DL<=0.005 MIU/L-ACNC: 3.5 UIU/ML (ref 0.4–4)
WBC # BLD AUTO: 15.06 K/UL (ref 3.9–12.7)
WBC # BLD AUTO: 15.06 K/UL (ref 3.9–12.7)

## 2021-05-04 PROCEDURE — 84443 ASSAY THYROID STIM HORMONE: CPT | Performed by: NURSE PRACTITIONER

## 2021-05-04 PROCEDURE — 36415 COLL VENOUS BLD VENIPUNCTURE: CPT | Mod: ,,, | Performed by: NURSE PRACTITIONER

## 2021-05-04 PROCEDURE — 80061 LIPID PANEL: CPT | Performed by: NURSE PRACTITIONER

## 2021-05-04 PROCEDURE — 85025 COMPLETE CBC W/AUTO DIFF WBC: CPT | Performed by: NURSE PRACTITIONER

## 2021-05-04 PROCEDURE — 36415 PR COLLECTION VENOUS BLOOD,VENIPUNCTURE: ICD-10-PCS | Mod: ,,, | Performed by: NURSE PRACTITIONER

## 2021-05-04 PROCEDURE — 80053 COMPREHEN METABOLIC PANEL: CPT | Performed by: NURSE PRACTITIONER

## 2021-05-05 DIAGNOSIS — D72.828 GRANULOCYTOSIS: ICD-10-CM

## 2021-05-05 DIAGNOSIS — D72.829 LEUKOCYTOSIS, UNSPECIFIED TYPE: Primary | ICD-10-CM

## 2021-05-19 ENCOUNTER — TELEPHONE (OUTPATIENT)
Dept: FAMILY MEDICINE | Facility: CLINIC | Age: 56
End: 2021-05-19

## 2021-06-02 DIAGNOSIS — Z12.11 COLON CANCER SCREENING: ICD-10-CM

## 2021-07-07 ENCOUNTER — PATIENT MESSAGE (OUTPATIENT)
Dept: ADMINISTRATIVE | Facility: HOSPITAL | Age: 56
End: 2021-07-07

## 2021-10-07 ENCOUNTER — PATIENT MESSAGE (OUTPATIENT)
Dept: ADMINISTRATIVE | Facility: HOSPITAL | Age: 56
End: 2021-10-07

## 2022-03-04 ENCOUNTER — TELEPHONE (OUTPATIENT)
Dept: FAMILY MEDICINE | Facility: CLINIC | Age: 57
End: 2022-03-04
Payer: MEDICARE

## 2022-03-04 NOTE — TELEPHONE ENCOUNTER
----- Message from Hawa Blanton sent at 3/4/2022 10:13 AM CST -----  Type: Needs Medical Advice  Who Called:  Pt wife  Best Call Back Number: [phone]  Additional Information: Pt requesting rx refill and check up appt for Monday--please advise--thank you

## 2022-03-04 NOTE — TELEPHONE ENCOUNTER
Called and spoke with patient - is now scheduled for appointment virtually with Dr. Grover on Monday as requested. Patient declined openings for this afternoon in office with BRYSON Godfrey.

## 2022-03-07 ENCOUNTER — OFFICE VISIT (OUTPATIENT)
Dept: FAMILY MEDICINE | Facility: CLINIC | Age: 57
End: 2022-03-07
Payer: MEDICARE

## 2022-03-07 DIAGNOSIS — I10 ESSENTIAL HYPERTENSION: ICD-10-CM

## 2022-03-07 DIAGNOSIS — J30.1 NON-SEASONAL ALLERGIC RHINITIS DUE TO POLLEN: ICD-10-CM

## 2022-03-07 DIAGNOSIS — K21.9 GASTROESOPHAGEAL REFLUX DISEASE WITHOUT ESOPHAGITIS: ICD-10-CM

## 2022-03-07 DIAGNOSIS — Z11.4 SCREENING FOR HIV (HUMAN IMMUNODEFICIENCY VIRUS): ICD-10-CM

## 2022-03-07 DIAGNOSIS — M62.838 MUSCLE SPASM: ICD-10-CM

## 2022-03-07 DIAGNOSIS — J30.89 NON-SEASONAL ALLERGIC RHINITIS, UNSPECIFIED TRIGGER: ICD-10-CM

## 2022-03-07 DIAGNOSIS — J44.9 CHRONIC OBSTRUCTIVE PULMONARY DISEASE, UNSPECIFIED COPD TYPE: Primary | ICD-10-CM

## 2022-03-07 DIAGNOSIS — G62.9 NEUROPATHY: ICD-10-CM

## 2022-03-07 DIAGNOSIS — F41.8 MIXED ANXIETY AND DEPRESSIVE DISORDER: ICD-10-CM

## 2022-03-07 DIAGNOSIS — D72.829 LEUKOCYTOSIS, UNSPECIFIED TYPE: ICD-10-CM

## 2022-03-07 DIAGNOSIS — Z12.5 SCREENING FOR PROSTATE CANCER: ICD-10-CM

## 2022-03-07 DIAGNOSIS — G50.0 TRIGEMINAL NEURALGIA: ICD-10-CM

## 2022-03-07 DIAGNOSIS — N52.9 IMPOTENCE DUE TO ERECTILE DYSFUNCTION: ICD-10-CM

## 2022-03-07 DIAGNOSIS — E03.9 HYPOTHYROIDISM, UNSPECIFIED TYPE: ICD-10-CM

## 2022-03-07 DIAGNOSIS — E78.2 MIXED HYPERLIPIDEMIA: ICD-10-CM

## 2022-03-07 DIAGNOSIS — Z11.59 ENCOUNTER FOR HEPATITIS C SCREENING TEST FOR LOW RISK PATIENT: ICD-10-CM

## 2022-03-07 PROBLEM — E78.5 HYPERLIPIDEMIA: Status: ACTIVE | Noted: 2022-03-07

## 2022-03-07 PROBLEM — J30.9 ALLERGIC RHINITIS: Status: ACTIVE | Noted: 2022-03-07

## 2022-03-07 PROCEDURE — 99214 OFFICE O/P EST MOD 30 MIN: CPT | Mod: GT,,, | Performed by: STUDENT IN AN ORGANIZED HEALTH CARE EDUCATION/TRAINING PROGRAM

## 2022-03-07 PROCEDURE — 99214 PR OFFICE/OUTPT VISIT, EST, LEVL IV, 30-39 MIN: ICD-10-PCS | Mod: GT,,, | Performed by: STUDENT IN AN ORGANIZED HEALTH CARE EDUCATION/TRAINING PROGRAM

## 2022-03-07 RX ORDER — SILDENAFIL 25 MG/1
25 TABLET, FILM COATED ORAL DAILY PRN
Qty: 10 TABLET | Refills: 3 | Status: SHIPPED | OUTPATIENT
Start: 2022-03-07

## 2022-03-07 RX ORDER — LEVOTHYROXINE SODIUM 75 UG/1
75 TABLET ORAL
Qty: 90 TABLET | Refills: 3 | Status: SHIPPED | OUTPATIENT
Start: 2022-03-07

## 2022-03-07 RX ORDER — FLUTICASONE PROPIONATE 50 MCG
1 SPRAY, SUSPENSION (ML) NASAL 2 TIMES DAILY
Qty: 15.8 ML | Refills: 11 | Status: SHIPPED | OUTPATIENT
Start: 2022-03-07

## 2022-03-07 RX ORDER — VILAZODONE HYDROCHLORIDE 20 MG/1
20 TABLET ORAL DAILY
Qty: 90 TABLET | Refills: 3 | Status: SHIPPED | OUTPATIENT
Start: 2022-03-07 | End: 2022-09-03

## 2022-03-07 RX ORDER — FLUTICASONE PROPIONATE AND SALMETEROL 50; 250 UG/1; UG/1
1 POWDER RESPIRATORY (INHALATION) 2 TIMES DAILY
Qty: 180 EACH | Refills: 3 | Status: SHIPPED | OUTPATIENT
Start: 2022-03-07 | End: 2023-03-02

## 2022-03-07 RX ORDER — GABAPENTIN 800 MG/1
800 TABLET ORAL 3 TIMES DAILY
Qty: 270 TABLET | Refills: 3 | Status: SHIPPED | OUTPATIENT
Start: 2022-03-07 | End: 2023-03-02

## 2022-03-07 RX ORDER — LOSARTAN POTASSIUM 25 MG/1
25 TABLET ORAL DAILY
Qty: 90 TABLET | Refills: 3 | Status: SHIPPED | OUTPATIENT
Start: 2022-03-07 | End: 2023-03-07

## 2022-03-07 RX ORDER — MONTELUKAST SODIUM 10 MG/1
10 TABLET ORAL NIGHTLY
Qty: 90 TABLET | Refills: 3 | Status: SHIPPED | OUTPATIENT
Start: 2022-03-07

## 2022-03-07 RX ORDER — METHOCARBAMOL 750 MG/1
750 TABLET, FILM COATED ORAL 3 TIMES DAILY
Qty: 90 TABLET | Refills: 3 | Status: SHIPPED | OUTPATIENT
Start: 2022-03-07

## 2022-03-07 RX ORDER — OMEPRAZOLE 40 MG/1
40 CAPSULE, DELAYED RELEASE ORAL DAILY
Qty: 90 CAPSULE | Refills: 3 | Status: SHIPPED | OUTPATIENT
Start: 2022-03-07

## 2022-03-07 RX ORDER — ATORVASTATIN CALCIUM 20 MG/1
20 TABLET, FILM COATED ORAL DAILY
Qty: 90 TABLET | Refills: 3 | Status: SHIPPED | OUTPATIENT
Start: 2022-03-07 | End: 2023-03-07

## 2022-03-07 RX ORDER — FENOFIBRATE 160 MG/1
160 TABLET ORAL DAILY
Qty: 90 TABLET | Refills: 3 | Status: SHIPPED | OUTPATIENT
Start: 2022-03-07 | End: 2023-03-07

## 2022-03-07 NOTE — ASSESSMENT & PLAN NOTE
130s/80s at home.  Every once in a while it runs really high.  Felt like lisinopril made him tired. Change to losartan

## 2022-03-07 NOTE — ASSESSMENT & PLAN NOTE
Continue atorvastatin and fenofibrate  Lab Results   Component Value Date    CHOL 183 05/04/2021    CHOL 210 (H) 01/07/2021     Lab Results   Component Value Date    HDL 37 (L) 05/04/2021    HDL 40 01/07/2021     Lab Results   Component Value Date    LDLCALC 105.2 05/04/2021    LDLCALC 107.2 01/07/2021     Lab Results   Component Value Date    TRIG 204 (H) 05/04/2021    TRIG 314 (H) 01/07/2021     Lab Results   Component Value Date    CHOLHDL 20.2 05/04/2021    CHOLHDL 19.0 (L) 01/07/2021     The 10-year ASCVD risk score (Dimitrios SCHMIDT Jr., et al., 2013) is: 15.5%    Values used to calculate the score:      Age: 56 years      Sex: Male      Is Non- : No      Diabetic: No      Tobacco smoker: Yes      Systolic Blood Pressure: 144 mmHg      Is BP treated: No      HDL Cholesterol: 37 mg/dL      Total Cholesterol: 183 mg/dL

## 2022-03-07 NOTE — PROGRESS NOTES
The patient location is: [facility]  The chief complaint leading to consultation is: followup    Visit type: audiovisual    Face to Face time with patient: 13 min  15 minutes of total time spent on the encounter, which includes face to face time and non-face to face time preparing to see the patient (eg, review of tests), Obtaining and/or reviewing separately obtained history, Documenting clinical information in the electronic or other health record, Independently interpreting results (not separately reported) and communicating results to the patient/family/caregiver, or Care coordination (not separately reported).     Each patient to whom he or she provides medical services by telemedicine is:  (1) informed of the relationship between the physician and patient and the respective role of any other health care provider with respect to management of the patient; and (2) notified that he or she may decline to receive medical services by telemedicine and may withdraw from such care at any time.    Subjective:       Patient ID: Valentin Orta Jr. is a 56 y.o. male.    Chief Complaint: Hypertension      Overall mr Orta is stable and doing well.  He has no acute complaints today and just needs refill of his chronic medications.  He would like to re-establish with oncology for his chronic leukocytosis.  Otherwise he has no acute needs     He is following up for the following chronic conditions  Patient Active Problem List:     COPD (chronic obstructive pulmonary disease)     Leukocytosis     Trigeminal neuralgia     Allergic rhinitis     GERD (gastroesophageal reflux disease)     Muscle spasm     Impotence due to erectile dysfunction     Mixed anxiety and depressive disorder     Hypothyroidism     Essential hypertension- he reports the lisinopril made him feel bad, no n/v. Just headachy, tired, and felt poorly so he stopped it.     Hyperlipidemia    His medications have been updated to include  Current Outpatient  Medications:  ADVAIR DISKUS 250-50 mcg/dose diskus inhaler, Inhale 1 puff into the lungs 2 (two) times a day.  atorvastatin (LIPITOR) 20 MG tablet, Take 1 tablet (20 mg total) by mouth once daily.  fenofibrate 160 MG Tab, Take 1 tablet (160 mg total) by mouth once daily.  fluticasone propionate (FLONASE) 50 mcg/actuation nasal spray, 1 spray (50 mcg total) by Each Nostril route 2 (two) times a day.  gabapentin (NEURONTIN) 800 MG tablet, Take 1 tablet (800 mg total) by mouth 3 (three) times daily.  levothyroxine (SYNTHROID) 75 MCG tablet, Take 1 tablet (75 mcg total) by mouth before breakfast.  losartan (COZAAR) 25 MG tablet, Take 1 tablet (25 mg total) by mouth once daily.  methocarbamoL (ROBAXIN) 750 MG Tab, Take 1 tablet (750 mg total) by mouth 3 (three) times daily.  montelukast (SINGULAIR) 10 mg tablet, Take 1 tablet (10 mg total) by mouth every evening.  omeprazole (PRILOSEC) 40 MG capsule, Take 1 capsule (40 mg total) by mouth once daily.  sildenafiL (VIAGRA) 25 MG tablet, Take 1 tablet (25 mg total) by mouth daily as needed for Erectile Dysfunction.  triamcinolone acetonide 0.1% (KENALOG) 0.1 % cream, APPLY ON THE SKIN TWICE A DAY AS NEEDED  vilazodone (VIIBRYD) 20 mg Tab, Take 1 tablet (20 mg total) by mouth once daily.    No current facility-administered medications for this visit.        Review of Systems   Constitutional: Negative for activity change and unexpected weight change.   HENT: Negative for hearing loss, rhinorrhea and trouble swallowing.    Eyes: Negative for discharge and visual disturbance.   Respiratory: Negative for wheezing.    Gastrointestinal: Negative for blood in stool and vomiting.   Endocrine: Negative for polydipsia.   Genitourinary: Negative for difficulty urinating, hematuria and urgency.   Musculoskeletal: Negative for arthralgias, joint swelling and neck pain.   Neurological: Positive for headaches. Negative for weakness.   Psychiatric/Behavioral: Negative for confusion and  dysphoric mood. The patient is not nervous/anxious.          Objective:      Physical Exam  Constitutional:       General: He is not in acute distress.     Appearance: Normal appearance. He is not ill-appearing.      Comments: Visually impaired  Wife at bedside helping with visit.   Pulmonary:      Effort: Pulmonary effort is normal. No respiratory distress.   Neurological:      Mental Status: He is alert.   Psychiatric:         Mood and Affect: Mood normal.         Behavior: Behavior normal.         Thought Content: Thought content normal.         Judgment: Judgment normal.         Assessment:       1. Chronic obstructive pulmonary disease, unspecified COPD type    2. Mixed hyperlipidemia    3. Essential hypertension    4. Hypothyroidism, unspecified type    5. Trigeminal neuralgia    6. Non-seasonal allergic rhinitis due to pollen    7. Gastroesophageal reflux disease without esophagitis    8. Mixed anxiety and depressive disorder    9. Impotence due to erectile dysfunction    10. Muscle spasm    11. Leukocytosis, unspecified type    12. Non-seasonal allergic rhinitis, unspecified trigger    13. Neuropathy    14. Screening for prostate cancer    15. Screening for HIV (human immunodeficiency virus)    16. Encounter for hepatitis C screening test for low risk patient        Plan:       Problem List Items Addressed This Visit        Neuro    Trigeminal neuralgia     Chronic neurontin              Psychiatric    Mixed anxiety and depressive disorder     Stable on vibryd           Relevant Medications    vilazodone (VIIBRYD) 20 mg Tab       Pulmonary    COPD (chronic obstructive pulmonary disease) - Primary     Stable on advair and prn albuterol           Relevant Medications    ADVAIR DISKUS 250-50 mcg/dose diskus inhaler       Cardiac/Vascular    Essential hypertension     130s/80s at home.  Every once in a while it runs really high.  Felt like lisinopril made him tired. Change to losartan           Relevant  Medications    losartan (COZAAR) 25 MG tablet    Other Relevant Orders    CBC Auto Differential    Comprehensive Metabolic Panel    Hyperlipidemia     Continue atorvastatin and fenofibrate  Lab Results   Component Value Date    CHOL 183 05/04/2021    CHOL 210 (H) 01/07/2021     Lab Results   Component Value Date    HDL 37 (L) 05/04/2021    HDL 40 01/07/2021     Lab Results   Component Value Date    LDLCALC 105.2 05/04/2021    LDLCALC 107.2 01/07/2021     Lab Results   Component Value Date    TRIG 204 (H) 05/04/2021    TRIG 314 (H) 01/07/2021     Lab Results   Component Value Date    CHOLHDL 20.2 05/04/2021    CHOLHDL 19.0 (L) 01/07/2021     The 10-year ASCVD risk score (Dimitrios SCHMIDT Jr., et al., 2013) is: 15.5%    Values used to calculate the score:      Age: 56 years      Sex: Male      Is Non- : No      Diabetic: No      Tobacco smoker: Yes      Systolic Blood Pressure: 144 mmHg      Is BP treated: No      HDL Cholesterol: 37 mg/dL      Total Cholesterol: 183 mg/dL             Relevant Medications    atorvastatin (LIPITOR) 20 MG tablet    fenofibrate 160 MG Tab    Other Relevant Orders    Lipid Panel       Renal/    Impotence due to erectile dysfunction     Continue viagra           Relevant Medications    sildenafiL (VIAGRA) 25 MG tablet       Oncology    Leukocytosis     Chroniclly elevated. He has seen hematology/oncology.           Relevant Orders    Ambulatory referral/consult to Hematology / Oncology       Endocrine    Hypothyroidism     Stable on synthroid  Lab Results   Component Value Date    TSH 3.500 05/04/2021                Relevant Medications    levothyroxine (SYNTHROID) 75 MCG tablet    Other Relevant Orders    TSH       GI    GERD (gastroesophageal reflux disease)     Stable on prilosec           Relevant Medications    omeprazole (PRILOSEC) 40 MG capsule       Orthopedic    Muscle spasm     Stable on robaxin           Relevant Medications    methocarbamoL (ROBAXIN) 750 MG Tab        Other    Allergic rhinitis     Stable on flonase and singulair           Relevant Medications    fluticasone propionate (FLONASE) 50 mcg/actuation nasal spray    montelukast (SINGULAIR) 10 mg tablet      Other Visit Diagnoses     Neuropathy        Relevant Medications    gabapentin (NEURONTIN) 800 MG tablet    Screening for prostate cancer        Relevant Orders    PSA, Screening    Screening for HIV (human immunodeficiency virus)        Relevant Orders    HIV 1/2 Ag/Ab (4th Gen)    Encounter for hepatitis C screening test for low risk patient        Relevant Orders    Hepatitis C Antibody

## 2022-03-08 ENCOUNTER — TELEPHONE (OUTPATIENT)
Dept: HEMATOLOGY/ONCOLOGY | Facility: CLINIC | Age: 57
End: 2022-03-08
Payer: MEDICARE

## 2022-03-08 NOTE — TELEPHONE ENCOUNTER
Called patient regarding referral to Hematology no answer left message on voicemail to return call

## 2022-03-14 ENCOUNTER — TELEPHONE (OUTPATIENT)
Dept: HEMATOLOGY/ONCOLOGY | Facility: CLINIC | Age: 57
End: 2022-03-14
Payer: MEDICARE

## 2022-03-15 ENCOUNTER — TELEPHONE (OUTPATIENT)
Dept: FAMILY MEDICINE | Facility: CLINIC | Age: 57
End: 2022-03-15
Payer: MEDICARE

## 2022-03-15 NOTE — TELEPHONE ENCOUNTER
Contacted pt to schedule nurse visit to check BP due to it being so high at last visit. LVM for pt to return call to schedule.

## 2022-03-16 ENCOUNTER — PATIENT MESSAGE (OUTPATIENT)
Dept: HEMATOLOGY/ONCOLOGY | Facility: CLINIC | Age: 57
End: 2022-03-16
Payer: MEDICARE

## 2022-03-16 ENCOUNTER — TELEPHONE (OUTPATIENT)
Dept: HEMATOLOGY/ONCOLOGY | Facility: CLINIC | Age: 57
End: 2022-03-16
Payer: MEDICARE

## 2022-03-16 NOTE — TELEPHONE ENCOUNTER
Called patient regarding referral to Hematoloyno answer left message on voicemail due to no success reaching him by phone will send message to patient portal

## 2022-05-31 ENCOUNTER — PATIENT MESSAGE (OUTPATIENT)
Dept: ADMINISTRATIVE | Facility: HOSPITAL | Age: 57
End: 2022-05-31
Payer: MEDICARE

## 2022-09-01 ENCOUNTER — PATIENT MESSAGE (OUTPATIENT)
Dept: ADMINISTRATIVE | Facility: HOSPITAL | Age: 57
End: 2022-09-01
Payer: MEDICARE

## 2022-09-26 ENCOUNTER — PATIENT MESSAGE (OUTPATIENT)
Dept: ADMINISTRATIVE | Facility: HOSPITAL | Age: 57
End: 2022-09-26
Payer: MEDICARE